# Patient Record
Sex: FEMALE | Race: WHITE | NOT HISPANIC OR LATINO | Employment: OTHER | URBAN - METROPOLITAN AREA
[De-identification: names, ages, dates, MRNs, and addresses within clinical notes are randomized per-mention and may not be internally consistent; named-entity substitution may affect disease eponyms.]

---

## 2021-07-16 ENCOUNTER — OFFICE VISIT (OUTPATIENT)
Dept: OBSTETRICS AND GYNECOLOGY | Facility: CLINIC | Age: 51
End: 2021-07-16
Payer: COMMERCIAL

## 2021-07-16 ENCOUNTER — TELEPHONE (OUTPATIENT)
Dept: OBSTETRICS AND GYNECOLOGY | Facility: CLINIC | Age: 51
End: 2021-07-16

## 2021-07-16 VITALS — WEIGHT: 266.56 LBS

## 2021-07-16 DIAGNOSIS — Z01.419 WELL WOMAN EXAM WITH ROUTINE GYNECOLOGICAL EXAM: Primary | ICD-10-CM

## 2021-07-16 PROCEDURE — 99386 PR PREVENTIVE VISIT,NEW,40-64: ICD-10-PCS | Mod: S$GLB,,, | Performed by: OBSTETRICS & GYNECOLOGY

## 2021-07-16 PROCEDURE — 99999 PR PBB SHADOW E&M-NEW PATIENT-LVL III: ICD-10-PCS | Mod: PBBFAC,,, | Performed by: OBSTETRICS & GYNECOLOGY

## 2021-07-16 PROCEDURE — 99999 PR PBB SHADOW E&M-NEW PATIENT-LVL III: CPT | Mod: PBBFAC,,, | Performed by: OBSTETRICS & GYNECOLOGY

## 2021-07-16 PROCEDURE — 88175 CYTOPATH C/V AUTO FLUID REDO: CPT | Performed by: OBSTETRICS & GYNECOLOGY

## 2021-07-16 PROCEDURE — 99386 PREV VISIT NEW AGE 40-64: CPT | Mod: S$GLB,,, | Performed by: OBSTETRICS & GYNECOLOGY

## 2021-07-16 RX ORDER — CETIRIZINE HYDROCHLORIDE 10 MG/1
10 TABLET ORAL DAILY PRN
COMMUNITY

## 2021-07-16 RX ORDER — MECLIZINE HCL 25MG 25 MG/1
25 TABLET, CHEWABLE ORAL 3 TIMES DAILY
COMMUNITY
End: 2021-11-22 | Stop reason: CLARIF

## 2021-07-16 RX ORDER — ALPRAZOLAM 0.5 MG/1
0.5 TABLET ORAL 3 TIMES DAILY
COMMUNITY
End: 2021-11-22 | Stop reason: CLARIF

## 2021-07-16 RX ORDER — LEVOTHYROXINE SODIUM 112 UG/1
112 TABLET ORAL
COMMUNITY
Start: 2021-05-03

## 2021-07-16 RX ORDER — MONTELUKAST SODIUM 10 MG/1
10 TABLET ORAL NIGHTLY PRN
COMMUNITY

## 2021-07-16 RX ORDER — OMEPRAZOLE 40 MG/1
40 CAPSULE, DELAYED RELEASE ORAL DAILY
COMMUNITY
End: 2021-11-22 | Stop reason: CLARIF

## 2021-07-16 RX ORDER — PRAVASTATIN SODIUM 40 MG/1
40 TABLET ORAL NIGHTLY
COMMUNITY
Start: 2021-05-03 | End: 2021-11-22 | Stop reason: CLARIF

## 2021-07-19 RX ORDER — MONTELUKAST SODIUM 10 MG/1
10 TABLET ORAL DAILY
Qty: 30 TABLET | Refills: 1 | Status: SHIPPED | OUTPATIENT
Start: 2021-07-19 | End: 2021-11-22 | Stop reason: CLARIF

## 2021-07-19 RX ORDER — MECLIZINE HYDROCHLORIDE 25 MG/1
25 TABLET ORAL 2 TIMES DAILY PRN
Qty: 60 TABLET | Refills: 1 | Status: SHIPPED | OUTPATIENT
Start: 2021-07-19

## 2021-07-19 RX ORDER — PRAVASTATIN SODIUM 40 MG/1
40 TABLET ORAL NIGHTLY
Qty: 30 TABLET | Refills: 1 | Status: SHIPPED | OUTPATIENT
Start: 2021-07-19 | End: 2022-12-14

## 2021-07-19 RX ORDER — CETIRIZINE HYDROCHLORIDE 10 MG/1
10 TABLET ORAL DAILY
Qty: 30 TABLET | Refills: 2 | Status: SHIPPED | OUTPATIENT
Start: 2021-07-19 | End: 2021-11-22 | Stop reason: CLARIF

## 2021-07-19 RX ORDER — ALPRAZOLAM 0.5 MG/1
0.5 TABLET ORAL 3 TIMES DAILY PRN
Qty: 30 TABLET | Refills: 0 | Status: SHIPPED | OUTPATIENT
Start: 2021-07-19 | End: 2022-12-14

## 2021-07-19 RX ORDER — OMEPRAZOLE 20 MG/1
20 TABLET, DELAYED RELEASE ORAL DAILY
Qty: 28 TABLET | Refills: 1 | Status: SHIPPED | OUTPATIENT
Start: 2021-07-19 | End: 2021-08-18

## 2021-07-21 LAB
FINAL PATHOLOGIC DIAGNOSIS: NORMAL
Lab: NORMAL

## 2021-08-02 ENCOUNTER — HOSPITAL ENCOUNTER (OUTPATIENT)
Dept: RADIOLOGY | Facility: HOSPITAL | Age: 51
Discharge: HOME | End: 2021-08-02
Attending: OBSTETRICS & GYNECOLOGY
Payer: COMMERCIAL

## 2021-08-02 DIAGNOSIS — Z01.419 WELL WOMAN EXAM WITH ROUTINE GYNECOLOGICAL EXAM: ICD-10-CM

## 2021-08-02 DIAGNOSIS — Z12.31 BREAST CANCER SCREENING BY MAMMOGRAM: ICD-10-CM

## 2021-08-02 PROCEDURE — 77063 MAMMO DIGITAL SCREENING BILAT WITH TOMO: ICD-10-PCS | Mod: 26,,, | Performed by: RADIOLOGY

## 2021-08-02 PROCEDURE — 77067 SCR MAMMO BI INCL CAD: CPT | Mod: 26,,, | Performed by: RADIOLOGY

## 2021-08-02 PROCEDURE — 77063 BREAST TOMOSYNTHESIS BI: CPT | Mod: 26,,, | Performed by: RADIOLOGY

## 2021-08-02 PROCEDURE — 77067 SCR MAMMO BI INCL CAD: CPT | Mod: TC,PO

## 2021-08-02 PROCEDURE — 77067 MAMMO DIGITAL SCREENING BILAT WITH TOMO: ICD-10-PCS | Mod: 26,,, | Performed by: RADIOLOGY

## 2021-08-18 RX ORDER — HYDROGEN PEROXIDE 3 %
20 SOLUTION, NON-ORAL MISCELLANEOUS DAILY
Qty: 30 CAPSULE | Refills: 4 | Status: SHIPPED | OUTPATIENT
Start: 2021-08-18 | End: 2021-11-10

## 2021-08-19 ENCOUNTER — TELEPHONE (OUTPATIENT)
Dept: OBSTETRICS AND GYNECOLOGY | Facility: CLINIC | Age: 51
End: 2021-08-19

## 2021-11-26 PROBLEM — R09.81 NASAL CONGESTION: Status: ACTIVE | Noted: 2021-11-26

## 2021-11-26 PROBLEM — G47.33 OBSTRUCTIVE SLEEP APNEA: Status: ACTIVE | Noted: 2021-11-26

## 2021-11-26 PROBLEM — J34.2 DEVIATED NASAL SEPTUM: Status: ACTIVE | Noted: 2021-11-26

## 2021-11-26 PROBLEM — J34.3 HYPERTROPHY OF NASAL TURBINATES: Status: ACTIVE | Noted: 2021-11-26

## 2021-11-26 PROBLEM — J30.1 ALLERGIC RHINITIS DUE TO POLLEN: Status: ACTIVE | Noted: 2021-11-26

## 2021-11-26 PROBLEM — J35.2 HYPERTROPHY OF ADENOIDS ALONE: Status: ACTIVE | Noted: 2021-11-26

## 2021-11-26 PROBLEM — R06.83 SNORING: Status: ACTIVE | Noted: 2021-11-26

## 2022-08-12 ENCOUNTER — PATIENT MESSAGE (OUTPATIENT)
Dept: FAMILY MEDICINE | Facility: CLINIC | Age: 52
End: 2022-08-12
Payer: COMMERCIAL

## 2022-08-12 ENCOUNTER — TELEPHONE (OUTPATIENT)
Dept: OBSTETRICS AND GYNECOLOGY | Facility: CLINIC | Age: 52
End: 2022-08-12

## 2022-08-12 ENCOUNTER — TELEPHONE (OUTPATIENT)
Dept: FAMILY MEDICINE | Facility: CLINIC | Age: 52
End: 2022-08-12
Payer: COMMERCIAL

## 2022-08-12 NOTE — TELEPHONE ENCOUNTER
----- Message from Azam Lockhart sent at 8/12/2022 11:50 AM CDT -----  Type:  Needs Medical Advice    Who Called: Pt   Symptoms (please be specific): cyst on ovaries   How long has patient had these symptoms:  ongoing  Pharmacy name and phone #:  [id number] - [place] - [address]  Would the patient rather a call back or a response via [facility]? Call back  Best Call Back Number:  [phone]  Additional Information:   Pt would like a call back with sooner appt   First appt was in October pt denied

## 2022-08-18 ENCOUNTER — OFFICE VISIT (OUTPATIENT)
Dept: OBSTETRICS AND GYNECOLOGY | Facility: CLINIC | Age: 52
End: 2022-08-18
Payer: COMMERCIAL

## 2022-08-18 ENCOUNTER — HOSPITAL ENCOUNTER (OUTPATIENT)
Dept: RADIOLOGY | Facility: HOSPITAL | Age: 52
Discharge: HOME | End: 2022-08-18
Attending: SPECIALIST
Payer: COMMERCIAL

## 2022-08-18 VITALS
DIASTOLIC BLOOD PRESSURE: 86 MMHG | WEIGHT: 275.56 LBS | HEIGHT: 68 IN | SYSTOLIC BLOOD PRESSURE: 124 MMHG | BODY MASS INDEX: 41.76 KG/M2

## 2022-08-18 DIAGNOSIS — Z12.31 ENCOUNTER FOR SCREENING MAMMOGRAM FOR MALIGNANT NEOPLASM OF BREAST: Primary | ICD-10-CM

## 2022-08-18 DIAGNOSIS — N83.209 CYST OF OVARY, UNSPECIFIED LATERALITY: ICD-10-CM

## 2022-08-18 DIAGNOSIS — Z01.419 ENCOUNTER FOR GYNECOLOGICAL EXAMINATION: ICD-10-CM

## 2022-08-18 DIAGNOSIS — Z12.31 ENCOUNTER FOR SCREENING MAMMOGRAM FOR MALIGNANT NEOPLASM OF BREAST: ICD-10-CM

## 2022-08-18 PROCEDURE — 99999 PR PBB SHADOW E&M-EST. PATIENT-LVL III: CPT | Mod: PBBFAC,,, | Performed by: SPECIALIST

## 2022-08-18 PROCEDURE — 88175 CYTOPATH C/V AUTO FLUID REDO: CPT | Performed by: SPECIALIST

## 2022-08-18 PROCEDURE — 77063 MAMMO DIGITAL SCREENING BILAT WITH TOMO: ICD-10-PCS | Mod: 26,,, | Performed by: RADIOLOGY

## 2022-08-18 PROCEDURE — 77067 SCR MAMMO BI INCL CAD: CPT | Mod: 26,,, | Performed by: RADIOLOGY

## 2022-08-18 PROCEDURE — 77063 BREAST TOMOSYNTHESIS BI: CPT | Mod: 26,,, | Performed by: RADIOLOGY

## 2022-08-18 PROCEDURE — 99396 PREV VISIT EST AGE 40-64: CPT | Mod: S$GLB,,, | Performed by: SPECIALIST

## 2022-08-18 PROCEDURE — 99396 PR PREVENTIVE VISIT,EST,40-64: ICD-10-PCS | Mod: S$GLB,,, | Performed by: SPECIALIST

## 2022-08-18 PROCEDURE — 99999 PR PBB SHADOW E&M-EST. PATIENT-LVL III: ICD-10-PCS | Mod: PBBFAC,,, | Performed by: SPECIALIST

## 2022-08-18 PROCEDURE — 77063 BREAST TOMOSYNTHESIS BI: CPT | Mod: TC,PN

## 2022-08-18 PROCEDURE — 77067 MAMMO DIGITAL SCREENING BILAT WITH TOMO: ICD-10-PCS | Mod: 26,,, | Performed by: RADIOLOGY

## 2022-08-19 ENCOUNTER — LAB VISIT (OUTPATIENT)
Dept: LAB | Facility: HOSPITAL | Age: 52
End: 2022-08-19
Attending: SPECIALIST
Payer: COMMERCIAL

## 2022-08-19 DIAGNOSIS — N83.209 CYST OF OVARY, UNSPECIFIED LATERALITY: ICD-10-CM

## 2022-08-19 LAB
CANCER AG125 SERPL-ACNC: 9 U/ML (ref 0–30)
CEA SERPL-MCNC: <1.7 NG/ML (ref 0–5)

## 2022-08-19 PROCEDURE — 82378 CARCINOEMBRYONIC ANTIGEN: CPT | Performed by: SPECIALIST

## 2022-08-19 PROCEDURE — 86304 IMMUNOASSAY TUMOR CA 125: CPT | Performed by: SPECIALIST

## 2022-08-19 PROCEDURE — 36415 COLL VENOUS BLD VENIPUNCTURE: CPT | Mod: PN | Performed by: SPECIALIST

## 2022-08-19 PROCEDURE — 86305 HUMAN EPIDIDYMIS PROTEIN 4: CPT | Performed by: SPECIALIST

## 2022-08-19 NOTE — PROGRESS NOTES
"  50 yo obese WF  presents with c/o incidental "ovarian cyst" noted on recent CT scan of pelvis Pt denies menorrhgia, weight loss/gain, dysuria, hematuria, c/d,n/v  Imaging at [facility]  Past Medical History:   Diagnosis Date    GERD (gastroesophageal reflux disease)     Hyperlipidemia     PONV (postoperative nausea and vomiting)     Sinus infection     Sleep apnea     no cpap    Thyroid disease     Vertigo        Past Surgical History:   Procedure Laterality Date    ADENOIDECTOMY N/A 2021    Procedure: ADENOIDECTOMY;  Surgeon: Abilio Hodges MD;  Location: [facility];  Service: ENT;  Laterality: N/A;     SECTION      DILATION AND CURETTAGE OF UTERUS      NASAL SEPTOPLASTY Bilateral 2021    Procedure: SEPTOPLASTY, NOSE;  Surgeon: Abilio Hodges MD;  Location: [facility];  Service: ENT;  Laterality: Bilateral;    TONSILLECTOMY, ADENOIDECTOMY      TUBAL LIGATION         Family History   Problem Relation Age of Onset    Aneurysm Mother     Diabetes Father     Hypertension Father     Gout Father     Breast cancer Neg Hx     Cancer Neg Hx     Colon cancer Neg Hx     Eclampsia Neg Hx     Miscarriages / Stillbirths Neg Hx     Ovarian cancer Neg Hx      labor Neg Hx     Stroke Neg Hx        Social History     Socioeconomic History    Marital status:    Tobacco Use    Smoking status: Never Smoker    Smokeless tobacco: Never Used   Substance and Sexual Activity    Alcohol use: Yes     Comment: socially    Drug use: Never    Sexual activity: Yes     Partners: Male       Current Outpatient Medications   Medication Sig Dispense Refill    cetirizine (ZYRTEC) 10 MG tablet Take 10 mg by mouth daily as needed.      esomeprazole (NEXIUM) 20 MG capsule TAKE 1 CAPSULE BY MOUTH EVERY DAY 90 capsule 1    levothyroxine (SYNTHROID) 112 MCG tablet Take 112 mcg by mouth once daily.      meclizine (ANTIVERT) 25 mg tablet Take 1 tablet (25 mg total) by mouth 2 (two) times " daily as needed. 60 tablet 1    montelukast (SINGULAIR) 10 mg tablet Take 10 mg by mouth nightly as needed.      multivitamin (THERAGRAN) per tablet Take 1 tablet by mouth once daily.      ALPRAZolam (XANAX) 0.5 MG tablet Take 1 tablet (0.5 mg total) by mouth 3 (three) times daily as needed for Insomnia or Anxiety. 30 tablet 0    pravastatin (PRAVACHOL) 40 MG tablet Take 1 tablet (40 mg total) by mouth every evening. 30 tablet 1     No current facility-administered medications for this visit.       Review of patient's allergies indicates:  No Known Allergies    Review of System:   General: no chills, fever, night sweats, weight gain or weight loss  Psychological: no depression or suicidal ideation  Breasts: no new or changing breast lumps, nipple discharge or masses.  Respiratory: no cough, shortness of breath, or wheezing  Cardiovascular: no chest pain or dyspnea on exertion  Gastrointestinal: no abdominal pain, change in bowel habits, or black or bloody stools  Genito-Urinary: no incontinence, urinary frequency/urgency or vulvar/vaginal symptoms, pelvic pain or abnormal vaginal bleeding.  Musculoskeletal: no gait disturbance or muscular weakness                                              General Appearance    A and O x 4, Cooperative, no distress   Breasts    Abdomen   Deferred  Soft, non-tender, bowel sounds active all four quadrants,  no masses, no organomegaly    Genitourinary:   External rectal exam shows no thrombosed external hemorrhoids.   Pelvic exam was performed with patient supine.  No labial fusion.  There is no rash, lesion or injury on the right labia.  There is no rash, lesion or injury on the left labia.  No bleeding and no signs of injury around the vaginal introitus, urethra is without lesions and well supported. The cervix is visualized with no discharge, lesions or friability.  No vaginal discharge found.  No significant Cystocele, Enterocele or rectocele, and uterus well  supported.  Bimanual exam:  The urethra is normal to palpation and there are no palpable vaginal wall masses.  Uterus is not deviated, not enlarged, not fixed, normal shape and not tender.  Cervix exhibits no motion tenderness.   Right adnexum displays no mass and no tenderness.  Left adnexum displays no mass and no tenderness.   Extremities: Extremities normal, atraumatic, no cyanosis or edema                     NOTE  NURSING PERSONAL PRESENT FOR ENTIRE PHYSICAL EXAM    PAP submitted    I discussed potential pathology and rec  CEA and RENATE  Reviewing report from [facility] and consistent with Dermoid  I rec review of tumour markers and pending the above would shahbaz recommend oophorectomy possible [facility] BSO but will discuss further  Answered all questions and will follow

## 2022-08-22 LAB
CANCER AG125 SERPL IA-ACNC: 9 U/ML
HE4 SERPL-SCNC: 57 PMOL/L
ROMA SCORE PREMEN SERPL: 0.96
ROMA, POSTMENOPAUSAL: 0.93

## 2022-08-24 LAB
FINAL PATHOLOGIC DIAGNOSIS: NORMAL
Lab: NORMAL

## 2022-10-10 ENCOUNTER — TELEPHONE (OUTPATIENT)
Dept: OBSTETRICS AND GYNECOLOGY | Facility: CLINIC | Age: 52
End: 2022-10-10
Payer: COMMERCIAL

## 2022-10-10 NOTE — TELEPHONE ENCOUNTER
----- Message from Alba Barillas sent at 10/10/2022  8:40 AM CDT -----  Contact: Patient  Type:  Needs Medical Advice    Who Called: Patient       Would the patient rather a call back or a response via [facility]? Call     Best Call Back Number: [phone] (home)      Additional Information:  Patient would like to speak with the nurse to see what test does the doctor want the patient to do either a CT or an US.     Please call to advise

## 2022-10-10 NOTE — TELEPHONE ENCOUNTER
Pt had u/s done at [facility]--which is scanned into media, she states that she does not want to remove the ovaries or do a hysterectomy, she just wants the cyst removed. I advised pt that she needs to come in for an appointment to discuss this, but she would like to know if she needs a repeat u/s before--being that her u/s from [facility] said repeat in 8 weeks. Please review and advise.

## 2022-10-11 DIAGNOSIS — N83.209 CYST OF OVARY, UNSPECIFIED LATERALITY: Primary | ICD-10-CM

## 2022-10-17 ENCOUNTER — TELEPHONE (OUTPATIENT)
Dept: OBSTETRICS AND GYNECOLOGY | Facility: CLINIC | Age: 52
End: 2022-10-17
Payer: COMMERCIAL

## 2022-10-17 DIAGNOSIS — N83.209 CYST OF OVARY, UNSPECIFIED LATERALITY: Primary | ICD-10-CM

## 2022-10-17 NOTE — TELEPHONE ENCOUNTER
----- Message from Lizeth Allan sent at 10/17/2022  9:50 AM CDT -----  Contact: patient  Type: Needs Medical Advice  Who Called:  patient  Best Call Back Number: [phone] (home)   Additional Information: patient would like orders for US sent to [facility] on [address] in [place], # [phone], fax#[phone]

## 2022-10-17 NOTE — TELEPHONE ENCOUNTER
Spoke with patient and advised her that once Dr. Casillas signs the order we will send to [facility] for her to have the u/s!

## 2022-10-18 ENCOUNTER — TELEPHONE (OUTPATIENT)
Dept: OBSTETRICS AND GYNECOLOGY | Facility: CLINIC | Age: 52
End: 2022-10-18
Payer: COMMERCIAL

## 2022-10-18 NOTE — TELEPHONE ENCOUNTER
----- Message from Mecca Kaufman sent at 10/18/2022 10:46 AM CDT -----  Contact: self  Type: Needs Medical Advice    Who Called:  patient    Best Call Back Number: [phone]   Additional Information: The pt stated that she called [facility] to see if Dr. Casillas sent over the u/s orders and they stated that they have not received it. The patient would like orders for U/S sent to [facility] on [address] in [place], # [phone], fax#[phone]. Please call pt back once it has been sent. Thanks.

## 2022-11-03 ENCOUNTER — OFFICE VISIT (OUTPATIENT)
Dept: OBSTETRICS AND GYNECOLOGY | Facility: CLINIC | Age: 52
End: 2022-11-03
Payer: COMMERCIAL

## 2022-11-03 VITALS
DIASTOLIC BLOOD PRESSURE: 82 MMHG | SYSTOLIC BLOOD PRESSURE: 124 MMHG | BODY MASS INDEX: 42.44 KG/M2 | WEIGHT: 280 LBS | HEIGHT: 68 IN

## 2022-11-03 DIAGNOSIS — N83.209 CYST OF OVARY, UNSPECIFIED LATERALITY: Primary | ICD-10-CM

## 2022-11-03 PROCEDURE — 99999 PR PBB SHADOW E&M-EST. PATIENT-LVL III: ICD-10-PCS | Mod: PBBFAC,,, | Performed by: SPECIALIST

## 2022-11-03 PROCEDURE — 99999 PR PBB SHADOW E&M-EST. PATIENT-LVL III: CPT | Mod: PBBFAC,,, | Performed by: SPECIALIST

## 2022-11-03 PROCEDURE — 99213 OFFICE O/P EST LOW 20 MIN: CPT | Mod: S$GLB,,, | Performed by: SPECIALIST

## 2022-11-03 PROCEDURE — 99213 PR OFFICE/OUTPT VISIT, EST, LEVL III, 20-29 MIN: ICD-10-PCS | Mod: S$GLB,,, | Performed by: SPECIALIST

## 2022-11-04 DIAGNOSIS — N83.202 CYST OF LEFT OVARY: Primary | ICD-10-CM

## 2022-11-07 NOTE — PROGRESS NOTES
51 yo WF returns for follow up left adnexal mass noted on CT as well as pelvic u/s   Pt underwent Tumour markers panel with normal CEA ROAM and . Pt denies weight loss/gain, dysuria, flank pain, DUB  I discussed the above and discussed potential yet unlikley pathology and discussed tx options, including continued expectant management vs definative surgical exploration  I feel Dx lap with possible LSO would be prudent and I discussed the fact that although unlikley for malignancy, imagig as well as lab results are no substitute for tissue diagnosis  Past Medical History:   Diagnosis Date    GERD (gastroesophageal reflux disease)     Hyperlipidemia     PONV (postoperative nausea and vomiting)     Sinus infection     Sleep apnea     no cpap    Thyroid disease     Vertigo        Past Surgical History:   Procedure Laterality Date    ADENOIDECTOMY N/A 2021    Procedure: ADENOIDECTOMY;  Surgeon: Abilio Hodges MD;  Location: [facility];  Service: ENT;  Laterality: N/A;     SECTION      DILATION AND CURETTAGE OF UTERUS      NASAL SEPTOPLASTY Bilateral 2021    Procedure: SEPTOPLASTY, NOSE;  Surgeon: Abilio Hodges MD;  Location: [facility];  Service: ENT;  Laterality: Bilateral;    TONSILLECTOMY, ADENOIDECTOMY      TUBAL LIGATION         Family History   Problem Relation Age of Onset    Aneurysm Mother     Diabetes Father     Hypertension Father     Gout Father     Breast cancer Neg Hx     Cancer Neg Hx     Colon cancer Neg Hx     Eclampsia Neg Hx     Miscarriages / Stillbirths Neg Hx     Ovarian cancer Neg Hx      labor Neg Hx     Stroke Neg Hx        Social History     Socioeconomic History    Marital status:    Tobacco Use    Smoking status: Never    Smokeless tobacco: Never   Substance and Sexual Activity    Alcohol use: Yes     Comment: socially    Drug use: Never    Sexual activity: Yes     Partners: Male       Current Outpatient Medications   Medication Sig Dispense  Refill    cetirizine (ZYRTEC) 10 MG tablet Take 10 mg by mouth daily as needed.      esomeprazole (NEXIUM) 20 MG capsule TAKE 1 CAPSULE BY MOUTH EVERY DAY 90 capsule 1    levothyroxine (SYNTHROID) 112 MCG tablet Take 112 mcg by mouth once daily.      meclizine (ANTIVERT) 25 mg tablet Take 1 tablet (25 mg total) by mouth 2 (two) times daily as needed. 60 tablet 1    montelukast (SINGULAIR) 10 mg tablet Take 10 mg by mouth nightly as needed.      multivitamin (THERAGRAN) per tablet Take 1 tablet by mouth once daily.      ALPRAZolam (XANAX) 0.5 MG tablet Take 1 tablet (0.5 mg total) by mouth 3 (three) times daily as needed for Insomnia or Anxiety. 30 tablet 0    pravastatin (PRAVACHOL) 40 MG tablet Take 1 tablet (40 mg total) by mouth every evening. 30 tablet 1     No current facility-administered medications for this visit.       Review of patient's allergies indicates:  No Known Allergies    Review of System:   General: no chills, fever, night sweats, weight gain or weight loss  Psychological: no depression or suicidal ideation  Breasts: no new or changing breast lumps, nipple discharge or masses.  Respiratory: no cough, shortness of breath, or wheezing  Cardiovascular: no chest pain or dyspnea on exertion  Gastrointestinal: no abdominal pain, change in bowel habits, or black or bloody stools  Genito-Urinary: no incontinence, urinary frequency/urgency or vulvar/vaginal symptoms, pelvic pain or abnormal vaginal bleeding.  Musculoskeletal: no gait disturbance or muscular weakness                                               General Appearance    A and O x 4, Cooperative, no distress   Breasts    Abdomen   Deferred    Soft, non-tender, bowel sounds active all four quadrants,  no masses, no organomegaly    Genitourinary:   External rectal exam shows no thrombosed external hemorrhoids.   Pelvic exam was performed with patient supine.  No labial fusion.  There is no rash, lesion or injury on the right labia.  There is no  rash, lesion or injury on the left labia.  No bleeding and no signs of injury around the vaginal introitus, urethra is without lesions and well supported. The cervix is visualized with no discharge, lesions or friability.  No vaginal discharge found.  No significant Cystocele, Enterocele or rectocele, and uterus well supported.  Bimanual exam:  The urethra is normal to palpation and there are no palpable vaginal wall masses.  Uterus is not deviated, not enlarged, not fixed, normal shape and not tender.  Cervix exhibits no motion tenderness.   Right adnexum displays no mass and no tenderness.  Left adnexum displays no mass and no tenderness.   Extremities: Extremities normal, atraumatic, no cyanosis or edema                     NOTE  NURSING PERSONAL PRESENT FOR ENTIRE PHYSICAL EXAM     After discussion, pt is agreeable to Dx lap and we will repat marker panel today and pending the above plan on definative procedure  Answered all questions

## 2022-11-21 ENCOUNTER — TELEPHONE (OUTPATIENT)
Dept: OBSTETRICS AND GYNECOLOGY | Facility: CLINIC | Age: 52
End: 2022-11-21
Payer: COMMERCIAL

## 2022-11-21 NOTE — TELEPHONE ENCOUNTER
----- Message from Shakeel Marquez sent at 11/21/2022  2:22 PM CST -----  Contact: pt at [phone]  Type: Needs Medical Advice  Who Called:  pt   Best Call Back Number: [phone]    Additional Information: Pt called in with a message asking that if there is any way to move her procedure up sooner to give her call because she has court date the day after and she wont be in any shape to go to court but if there are any cancels she would love to go sooner please call back to advise

## 2022-11-21 NOTE — TELEPHONE ENCOUNTER
----- Message from Bhumi Molina sent at 11/21/2022  1:13 PM CST -----  Contact: [phone]  Type: Needs Medical Advice  Who Called:  Pt     Best Call Back Number: [phone]    Additional Information: Pt requesting a sooner surgery date than 12/14 because she has court on 12/15. Pls call back and advise

## 2022-11-21 NOTE — TELEPHONE ENCOUNTER
Spoke with patient. She has court on 12/15/22. She was trying to see if any surgery dates are open before her date on 12/14. Informed patient completely booked on surgeries. Booking in January if it will need to be canceled. Patient verb understanding.

## 2022-11-29 ENCOUNTER — TELEPHONE (OUTPATIENT)
Dept: OBSTETRICS AND GYNECOLOGY | Facility: CLINIC | Age: 52
End: 2022-11-29
Payer: COMMERCIAL

## 2022-11-29 NOTE — TELEPHONE ENCOUNTER
----- Message from Kerry Hinojosa sent at 11/29/2022 10:39 AM CST -----  Contact: PATIENT  Type: Apoointment Request    Name of Caller:PATIENT  When is the first available appointment?12/14/22  Would the patient rather a call back or a response via [facility]? CALL   Best Call Back Number:[phone]  Additional Information: PT REQUEST A CALL BACK TO RESCHEDULE HER 12/14/22 PROCEDURE

## 2022-12-08 ENCOUNTER — TELEPHONE (OUTPATIENT)
Dept: OBSTETRICS AND GYNECOLOGY | Facility: CLINIC | Age: 52
End: 2022-12-08
Payer: COMMERCIAL

## 2022-12-08 NOTE — TELEPHONE ENCOUNTER
----- Message from Cheyenne Sewell, Patient Care Assistant sent at 12/8/2022  3:08 PM CST -----  Contact: Pt  Type: Needs Medical Advice    Who Called: Pt  Best Call Back Number: [phone]  Inquiry/Question: Pt is calling to see if she can get a 3-5 day supply of pain medication for after her surgery due slick having to attend an important appt with her family the day after her procedure. Please advise. Thank you~      [id number] - [place] - [address] [address]  [address] [place] [address]  Phone: [phone] Fax: [phone]

## 2022-12-14 PROBLEM — N83.202 CYST OF LEFT OVARY: Status: ACTIVE | Noted: 2022-12-14

## 2022-12-19 ENCOUNTER — PATIENT MESSAGE (OUTPATIENT)
Dept: OBSTETRICS AND GYNECOLOGY | Facility: CLINIC | Age: 52
End: 2022-12-19
Payer: COMMERCIAL

## 2022-12-29 ENCOUNTER — PATIENT MESSAGE (OUTPATIENT)
Dept: OBSTETRICS AND GYNECOLOGY | Facility: CLINIC | Age: 52
End: 2022-12-29
Payer: COMMERCIAL

## 2023-01-04 ENCOUNTER — OFFICE VISIT (OUTPATIENT)
Dept: OBSTETRICS AND GYNECOLOGY | Facility: CLINIC | Age: 53
End: 2023-01-04
Payer: COMMERCIAL

## 2023-01-04 VITALS
WEIGHT: 280.19 LBS | DIASTOLIC BLOOD PRESSURE: 70 MMHG | SYSTOLIC BLOOD PRESSURE: 130 MMHG | HEIGHT: 67 IN | BODY MASS INDEX: 43.98 KG/M2

## 2023-01-04 DIAGNOSIS — Z09 POSTOPERATIVE EXAMINATION: Primary | ICD-10-CM

## 2023-01-04 PROCEDURE — 99024 POSTOP FOLLOW-UP VISIT: CPT | Mod: S$GLB,,, | Performed by: SPECIALIST

## 2023-01-04 PROCEDURE — 99999 PR PBB SHADOW E&M-EST. PATIENT-LVL III: ICD-10-PCS | Mod: PBBFAC,,, | Performed by: SPECIALIST

## 2023-01-04 PROCEDURE — 99024 PR POST-OP FOLLOW-UP VISIT: ICD-10-PCS | Mod: S$GLB,,, | Performed by: SPECIALIST

## 2023-01-04 PROCEDURE — 99999 PR PBB SHADOW E&M-EST. PATIENT-LVL III: CPT | Mod: PBBFAC,,, | Performed by: SPECIALIST

## 2023-01-04 NOTE — PROGRESS NOTES
53 yo WF 2 weeks s/p Dx lap with excision of left Dermoid  Pt doing well and denies pain, f/c n/v  Pathology reveiwed and discussed  Past Medical History:   Diagnosis Date    GERD (gastroesophageal reflux disease)     Hyperlipidemia     PONV (postoperative nausea and vomiting)     Sinus infection     Sleep apnea     uses cpap    Thyroid disease     Vertigo        Past Surgical History:   Procedure Laterality Date    ADENOIDECTOMY N/A 2021    Procedure: ADENOIDECTOMY;  Surgeon: Abilio Hodges MD;  Location: [facility];  Service: ENT;  Laterality: N/A;     SECTION      DILATION AND CURETTAGE OF UTERUS      LAPAROSCOPIC SALPINGO-OOPHORECTOMY Left 2022    Procedure: SALPINGO-OOPHORECTOMY, LAPAROSCOPIC;  Surgeon: Kevin Casillas MD;  Location: [facility];  Service: OB/GYN;  Laterality: Left;    NASAL SEPTOPLASTY Bilateral 2021    Procedure: SEPTOPLASTY, NOSE;  Surgeon: Abilio Hodges MD;  Location: [facility];  Service: ENT;  Laterality: Bilateral;    TONSILLECTOMY, ADENOIDECTOMY      TUBAL LIGATION         Family History   Problem Relation Age of Onset    Aneurysm Mother     Diabetes Father     Hypertension Father     Gout Father     Breast cancer Neg Hx     Cancer Neg Hx     Colon cancer Neg Hx     Eclampsia Neg Hx     Miscarriages / Stillbirths Neg Hx     Ovarian cancer Neg Hx      labor Neg Hx     Stroke Neg Hx        Social History     Socioeconomic History    Marital status:    Tobacco Use    Smoking status: Never    Smokeless tobacco: Never   Substance and Sexual Activity    Alcohol use: Yes     Comment: socially    Drug use: Never    Sexual activity: Yes     Partners: Male     Birth control/protection: See Surgical Hx       Current Outpatient Medications   Medication Sig Dispense Refill    cetirizine (ZYRTEC) 10 MG tablet Take 10 mg by mouth daily as needed.      esomeprazole (NEXIUM) 20 MG capsule TAKE 1 CAPSULE BY MOUTH EVERY DAY (Patient taking differently: Take by  mouth every morning.) 90 capsule 1    levothyroxine (SYNTHROID) 112 MCG tablet Take 112 mcg by mouth before breakfast.      meclizine (ANTIVERT) 25 mg tablet Take 1 tablet (25 mg total) by mouth 2 (two) times daily as needed. 60 tablet 1    montelukast (SINGULAIR) 10 mg tablet Take 10 mg by mouth nightly as needed.      multivitamin (THERAGRAN) per tablet Take 1 tablet by mouth once daily.      ALPRAZolam (XANAX) 0.5 MG tablet Take 1 tablet (0.5 mg total) by mouth 3 (three) times daily as needed for Insomnia or Anxiety. 30 tablet 0    pravastatin (PRAVACHOL) 40 MG tablet Take 1 tablet (40 mg total) by mouth every evening. 30 tablet 1     No current facility-administered medications for this visit.       Review of patient's allergies indicates:  No Known Allergies    Review of System:   General: no chills, fever, night sweats, weight gain or weight loss  Psychological: no depression or suicidal ideation  Breasts: no new or changing breast lumps, nipple discharge or masses.  Respiratory: no cough, shortness of breath, or wheezing  Cardiovascular: no chest pain or dyspnea on exertion  Gastrointestinal: no abdominal pain, change in bowel habits, or black or bloody stools  Genito-Urinary: no incontinence, urinary frequency/urgency or vulvar/vaginal symptoms, pelvic pain or abnormal vaginal bleeding.  Musculoskeletal: no gait disturbance or muscular weakness     Abdomen soft NT Incisions well approx Sutures removed    Plan Release restrictions  RTO 1 year/prn

## 2023-02-17 RX ORDER — PRAVASTATIN SODIUM 40 MG/1
40 TABLET ORAL NIGHTLY
Qty: 30 TABLET | Refills: 1 | OUTPATIENT
Start: 2023-02-17 | End: 2024-02-17

## 2023-02-17 RX ORDER — HYDROGEN PEROXIDE 3 %
20 SOLUTION, NON-ORAL MISCELLANEOUS DAILY
Qty: 90 CAPSULE | Refills: 1 | OUTPATIENT
Start: 2023-02-17

## 2023-02-17 RX ORDER — LEVOTHYROXINE SODIUM 112 UG/1
112 TABLET ORAL
Qty: 90 TABLET | OUTPATIENT
Start: 2023-02-17

## 2023-02-19 ENCOUNTER — OCCUPATIONAL HEALTH (OUTPATIENT)
Dept: URGENT CARE | Facility: CLINIC | Age: 53
End: 2023-02-19

## 2023-02-19 PROCEDURE — 80305 DRUG TEST PRSMV DIR OPT OBS: CPT | Mod: S$GLB,,, | Performed by: NURSE PRACTITIONER

## 2023-02-19 PROCEDURE — 80305 PR COLLECTION ONLY DRUG SCREEN: ICD-10-PCS | Mod: S$GLB,,, | Performed by: NURSE PRACTITIONER

## 2023-02-20 RX ORDER — LEVOTHYROXINE SODIUM 112 UG/1
TABLET ORAL
Qty: 90 TABLET | Refills: 1 | OUTPATIENT
Start: 2023-02-20

## 2024-08-12 PROBLEM — N95.0 POSTMENOPAUSAL BLEEDING: Status: ACTIVE | Noted: 2024-08-12

## 2024-08-12 PROBLEM — R87.610 PAP SMEAR ABNORMALITY OF CERVIX WITH ASCUS FAVORING BENIGN: Status: ACTIVE | Noted: 2024-08-12

## 2025-02-25 ENCOUNTER — TELEPHONE (OUTPATIENT)
Dept: PHYSICAL MEDICINE AND REHAB | Facility: CLINIC | Age: 55
End: 2025-02-25
Payer: COMMERCIAL

## 2025-02-25 NOTE — TELEPHONE ENCOUNTER
----- Message from Yisel sent at 2/25/2025  3:01 PM CST -----  Contact: self  Type:  Needs Medical AdviceWho Called: selfSymptoms (please be specific): pt sts she fell at work a couple weeks ago and is having knee and back pain and wanted to see dr because she read he specializes in chronic pain, and he is a knee specialist, please call pt to discuss.  Would the patient rather a call back or a response via [facility]? [facility] Call Back Number: [phone] (home) Additional Information: please advise and thank you.

## 2025-05-21 ENCOUNTER — HOSPITAL ENCOUNTER (OUTPATIENT)
Dept: RADIOLOGY | Facility: HOSPITAL | Age: 55
Discharge: HOME | End: 2025-05-21
Attending: CHIROPRACTOR
Payer: COMMERCIAL

## 2025-05-21 ENCOUNTER — OFFICE VISIT (OUTPATIENT)
Dept: ORTHOPEDICS | Facility: CLINIC | Age: 55
End: 2025-05-21
Payer: COMMERCIAL

## 2025-05-21 ENCOUNTER — TELEPHONE (OUTPATIENT)
Dept: URGENT CARE | Facility: CLINIC | Age: 55
End: 2025-05-21
Payer: COMMERCIAL

## 2025-05-21 ENCOUNTER — HOSPITAL ENCOUNTER (OUTPATIENT)
Dept: RADIOLOGY | Facility: HOSPITAL | Age: 55
Discharge: HOME | End: 2025-05-21
Attending: PHYSICIAN ASSISTANT
Payer: COMMERCIAL

## 2025-05-21 DIAGNOSIS — M25.562 ACUTE PAIN OF LEFT KNEE: ICD-10-CM

## 2025-05-21 DIAGNOSIS — S83.8X2A SPRAIN OF OTHER LIGAMENT OF LEFT KNEE, INITIAL ENCOUNTER: Primary | ICD-10-CM

## 2025-05-21 DIAGNOSIS — G89.29 CHRONIC PAIN OF LEFT KNEE: ICD-10-CM

## 2025-05-21 DIAGNOSIS — S83.412A SPRAIN OF MEDIAL COLLATERAL LIGAMENT OF LEFT KNEE, INITIAL ENCOUNTER: ICD-10-CM

## 2025-05-21 DIAGNOSIS — M25.562 CHRONIC PAIN OF LEFT KNEE: ICD-10-CM

## 2025-05-21 DIAGNOSIS — S80.02XA CONTUSION OF LEFT KNEE, INITIAL ENCOUNTER: ICD-10-CM

## 2025-05-21 PROCEDURE — 99999 PR PBB SHADOW E&M-EST. PATIENT-LVL III: CPT | Mod: PBBFAC,,, | Performed by: PHYSICIAN ASSISTANT

## 2025-05-21 PROCEDURE — 73721 MRI JNT OF LWR EXTRE W/O DYE: CPT | Mod: 26,LT,, | Performed by: RADIOLOGY

## 2025-05-21 PROCEDURE — 73562 X-RAY EXAM OF KNEE 3: CPT | Mod: 26,59,RT, | Performed by: RADIOLOGY

## 2025-05-21 PROCEDURE — 73564 X-RAY EXAM KNEE 4 OR MORE: CPT | Mod: 26,LT,, | Performed by: RADIOLOGY

## 2025-05-21 PROCEDURE — 73562 X-RAY EXAM OF KNEE 3: CPT | Mod: TC,PO,RT

## 2025-05-21 PROCEDURE — 73721 MRI JNT OF LWR EXTRE W/O DYE: CPT | Mod: TC,PO,LT

## 2025-05-21 PROCEDURE — 99203 OFFICE O/P NEW LOW 30 MIN: CPT | Mod: S$GLB,,, | Performed by: PHYSICIAN ASSISTANT

## 2025-05-21 RX ORDER — MELOXICAM 15 MG/1
15 TABLET ORAL DAILY
Qty: 30 TABLET | Refills: 1 | Status: SHIPPED | OUTPATIENT
Start: 2025-05-21 | End: 2025-06-20

## 2025-05-21 NOTE — PROGRESS NOTES
SUBJECTIVE:     Chief Complaint   Patient presents with    Left Knee - Pain, Injury       History of Present Illness:  Alondra Putnam is a 54 y.o. year old female here with complaints of constant left knee pain which started after an injury on 2025.   She also has mild right knee pain from the injury that has not resolved.  The injury occurred when she slipped and fell. The pain is located in the anterior and medial aspect of the knee.  Her symptoms significantly worsened the last couple of days.  There is catching or locking.  Aggravating factors include standing, walking.  Associated symptoms include swelling, weakness, buckling.  Previous treatments include rest, ice, tylenol, motrin, robaxin and flexeril.  She has also been seeing a chiropractor.  Ice seems to help the most. The patient does not use an assistive device.    Review of patient's allergies indicates:  No Known Allergies      Current Medications[1]    Past Medical History:   Diagnosis Date    GERD (gastroesophageal reflux disease)     Hyperlipidemia     PONV (postoperative nausea and vomiting)     Sinus infection     Sleep apnea     uses cpap    Thyroid disease     Vertigo        Past Surgical History:   Procedure Laterality Date    ADENOIDECTOMY N/A 2021    Procedure: ADENOIDECTOMY;  Surgeon: Abilio Hodges MD;  Location: [facility];  Service: ENT;  Laterality: N/A;     SECTION      COLPOSCOPY N/A 2024    Procedure: COLPOSCOPY;  Surgeon: Anitha Mc MD;  Location: [facility];  Service: OB/GYN;  Laterality: N/A;    DILATION AND CURETTAGE OF UTERUS      HYSTEROSCOPY WITH DILATION AND CURETTAGE OF UTERUS N/A 2024    Procedure: HYSTEROSCOPY, WITH DILATION AND CURETTAGE OF UTERUS/ ultrasound;  Surgeon: Anitha Mc MD;  Location: [facility];  Service: OB/GYN;  Laterality: N/A;    LAPAROSCOPIC SALPINGO-OOPHORECTOMY Left 2022    Procedure: SALPINGO-OOPHORECTOMY, LAPAROSCOPIC;  Surgeon: Kevin Casillas MD;  Location:  [facility];  Service: OB/GYN;  Laterality: Left;    NASAL SEPTOPLASTY Bilateral 11/26/2021    Procedure: SEPTOPLASTY, NOSE;  Surgeon: Abilio Hodges MD;  Location: [facility];  Service: ENT;  Laterality: Bilateral;    OOPHORECTOMY      TONSILLECTOMY, ADENOIDECTOMY      TUBAL LIGATION           OBJECTIVE:     PHYSICAL EXAM:  General: Well developed, well nourished, in no acute distress.  Neurological: Mood & affect are normal.  HEENT: NCAT, sclera nonicteric   Lungs: Respirations are equal and unlabored.   CV: 2+ bilateral upper and lower extremity pulses.   Skin: Intact throughout with no rashes, erythema, or lesions  Extremities: No LE edema, no erythema or warmth of the skin in either lower extremity.    left Knee Exam:  Knee Range of Motion: 0-110   Effusion: mild  Condition of skin: intact and no warmth  Location of tenderness: medial patella, medial joint line   Strength: 5 of 5 quadriceps strength and 5 of 5 hamstring strength  Stability:  stable to testing  Varus /Valgus stress:  normal  Luciana:  positive      IMAGING:    X-rays of the left knee, personally reviewed by me, demonstrate mild-moderate degenerative changes with joint space narrowing, osteophyte formation and subchondral sclerosis.  No fracture or dislocation.       ASSESSMENT     1. Sprain of other ligament of left knee, initial encounter    2. Chronic pain of left knee    3. Contusion of left knee, initial encounter        PLAN:     We discussed with the patient at length all the different treatment options available for the knee including NSAIDS, acetaminophen, rest, ice, knee strengthening exercise, steroid injections for temporary relief, Viscosupplementation, and surgical options    - Clinical and x-ray findings were discussed today  - Walker provided today.  Ok to remain FWBAT  - Continue rest, ice as needed  - Hinged knee brace today for stability  - Meloxicam prescription  - Pending MRI- will call with results tomorrow and discuss  further treatment    We performed a custom orthotic/brace fitting, adjusting and training with the patient. The patient demonstrated understanding and proper care. This was performed for 15 minutes.            [1]   Current Outpatient Medications   Medication Sig Dispense Refill    benzonatate (TESSALON) 100 MG capsule Take 100 mg by mouth 3 (three) times daily.      cetirizine (ZYRTEC) 10 MG tablet Take 10 mg by mouth daily as needed.      cyclobenzaprine (FLEXERIL) 10 MG tablet Take 10 mg by mouth.      levothyroxine (SYNTHROID) 112 MCG tablet Take 1 tablet (112 mcg total) by mouth before breakfast. 90 tablet 1    losartan (COZAAR) 25 MG tablet Take 1 tablet (25 mg total) by mouth once daily. 90 tablet 1    meclizine (ANTIVERT) 25 mg tablet Take 1 tablet (25 mg total) by mouth 2 (two) times daily as needed. 60 tablet 1    montelukast (SINGULAIR) 10 mg tablet Take 1 tablet (10 mg total) by mouth every evening. 90 tablet 1    multivitamin (THERAGRAN) per tablet Take 1 tablet by mouth once daily.      omeprazole (PRILOSEC) 20 MG capsule Take 20 mg by mouth once daily.      pravastatin (PRAVACHOL) 20 MG tablet Take 1 tablet (20 mg total) by mouth every evening. 90 tablet 1    progesterone (PROMETRIUM) 100 MG capsule Take 100 mg by mouth every evening. at bedtime      spironolactone (ALDACTONE) 50 MG tablet Take 1 tablet (50 mg total) by mouth once daily. 90 tablet 1    tirzepatide, weight loss, (ZEPBOUND) 2.5 mg/0.5 mL PnIj Inject 2.5 mg into the skin every 7 days. 4 Pen 0    ALPRAZolam (XANAX) 0.5 MG tablet Take 1 tablet (0.5 mg total) by mouth 3 (three) times daily as needed for Insomnia or Anxiety. 30 tablet 0     No current facility-administered medications for this visit.

## 2025-05-22 ENCOUNTER — PATIENT MESSAGE (OUTPATIENT)
Dept: ORTHOPEDICS | Facility: CLINIC | Age: 55
End: 2025-05-22
Payer: COMMERCIAL

## 2025-05-22 DIAGNOSIS — S83.241A ACUTE MEDIAL MENISCUS TEAR, RIGHT, INITIAL ENCOUNTER: Primary | ICD-10-CM

## 2025-05-28 ENCOUNTER — OFFICE VISIT (OUTPATIENT)
Dept: ORTHOPEDICS | Facility: CLINIC | Age: 55
End: 2025-05-28
Payer: COMMERCIAL

## 2025-05-28 VITALS — RESPIRATION RATE: 16 BRPM

## 2025-05-28 DIAGNOSIS — S83.241A ACUTE MEDIAL MENISCUS TEAR, RIGHT, INITIAL ENCOUNTER: ICD-10-CM

## 2025-05-28 DIAGNOSIS — S83.241A ACUTE MEDIAL MENISCUS TEAR, RIGHT, INITIAL ENCOUNTER: Primary | ICD-10-CM

## 2025-05-28 PROCEDURE — 99999 PR PBB SHADOW E&M-EST. PATIENT-LVL III: CPT | Mod: PBBFAC,,, | Performed by: ORTHOPAEDIC SURGERY

## 2025-05-28 NOTE — PROGRESS NOTES
Past Medical History:   Diagnosis Date    GERD (gastroesophageal reflux disease)     Hyperlipidemia     PONV (postoperative nausea and vomiting)     Sinus infection     Sleep apnea     uses cpap    Thyroid disease     Vertigo        Past Surgical History:   Procedure Laterality Date    ADENOIDECTOMY N/A 2021    Procedure: ADENOIDECTOMY;  Surgeon: Abilio Hodges MD;  Location: [facility];  Service: ENT;  Laterality: N/A;     SECTION      COLPOSCOPY N/A 2024    Procedure: COLPOSCOPY;  Surgeon: Anitha Mc MD;  Location: [facility];  Service: OB/GYN;  Laterality: N/A;    DILATION AND CURETTAGE OF UTERUS      HYSTEROSCOPY WITH DILATION AND CURETTAGE OF UTERUS N/A 2024    Procedure: HYSTEROSCOPY, WITH DILATION AND CURETTAGE OF UTERUS/ ultrasound;  Surgeon: Anitha Mc MD;  Location: [facility];  Service: OB/GYN;  Laterality: N/A;    LAPAROSCOPIC SALPINGO-OOPHORECTOMY Left 2022    Procedure: SALPINGO-OOPHORECTOMY, LAPAROSCOPIC;  Surgeon: Kevin Casillas MD;  Location: [facility];  Service: OB/GYN;  Laterality: Left;    NASAL SEPTOPLASTY Bilateral 2021    Procedure: SEPTOPLASTY, NOSE;  Surgeon: Abilio Hodges MD;  Location: [facility];  Service: ENT;  Laterality: Bilateral;    OOPHORECTOMY      TONSILLECTOMY, ADENOIDECTOMY      TUBAL LIGATION         Current Outpatient Medications   Medication Sig    ALPRAZolam (XANAX) 0.5 MG tablet Take 1 tablet (0.5 mg total) by mouth 3 (three) times daily as needed for Insomnia or Anxiety.    benzonatate (TESSALON) 100 MG capsule Take 100 mg by mouth 3 (three) times daily.    cetirizine (ZYRTEC) 10 MG tablet Take 10 mg by mouth daily as needed.    cyclobenzaprine (FLEXERIL) 10 MG tablet Take 10 mg by mouth.    levothyroxine (SYNTHROID) 112 MCG tablet Take 1 tablet (112 mcg total) by mouth before breakfast.    losartan (COZAAR) 25 MG tablet Take 1 tablet (25 mg total) by mouth once daily.    meclizine (ANTIVERT) 25 mg tablet Take 1 tablet (25 mg  total) by mouth 2 (two) times daily as needed.    meloxicam (MOBIC) 15 MG tablet Take 1 tablet (15 mg total) by mouth once daily.    montelukast (SINGULAIR) 10 mg tablet Take 1 tablet (10 mg total) by mouth every evening.    multivitamin (THERAGRAN) per tablet Take 1 tablet by mouth once daily.    omeprazole (PRILOSEC) 20 MG capsule Take 20 mg by mouth once daily.    pravastatin (PRAVACHOL) 20 MG tablet Take 1 tablet (20 mg total) by mouth every evening.    progesterone (PROMETRIUM) 100 MG capsule Take 100 mg by mouth every evening. at bedtime    spironolactone (ALDACTONE) 50 MG tablet Take 1 tablet (50 mg total) by mouth once daily.    tirzepatide, weight loss, (ZEPBOUND) 2.5 mg/0.5 mL PnIj Inject 2.5 mg into the skin every 7 days.     No current facility-administered medications for this visit.       Review of patient's allergies indicates:  No Known Allergies    Family History   Problem Relation Name Age of Onset    Aneurysm Mother      Diabetes Father      Hypertension Father      Gout Father      Hypertension Brother      Sudden death Brother      Breast cancer Neg Hx      Cancer Neg Hx      Colon cancer Neg Hx      Eclampsia Neg Hx      Miscarriages / Stillbirths Neg Hx      Ovarian cancer Neg Hx       labor Neg Hx      Stroke Neg Hx         Social History[1]    Chief Complaint: No chief complaint on file.      History of present illness:  54-year-old female seen for left knee pain.  Patient is seen in consultation for Sarah Cantu.  This was a worker's compensation injury when she slipped on some water while at work back in February.  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  Only previous treatment with some chiropractic care.  Patient complains of left sharp stabbing pain when she puts weight on it.  Has been using a brace.  Pain keeps her up at night.  Uses Flexeril.  Was just given Mobic about a week ago which does seem to help.  Rates her pain is an 8/10.  Denies locking  or instability    Prior treatment:  Brace and Mobic        Review of Systems:    Constitution: Negative for chills, fever, and sweats.  Negative for unexplained weight loss.    HENT:  Negative for headaches and blurry vision.    Cardiovascular:Negative for chest pain or irregular heart beat. Negative for hypertension.    Respiratory:  Negative for cough and shortness of breath.    Gastrointestinal: Negative for abdominal pain, heartburn, melena, nausea, and vomitting.    Genitourinary:  Negative bladder incontinence and dysuria.    Musculoskeletal:  See HPI    Neurological: Negative for numbness.    Psychiatric/Behavioral: Negative for depression.  The patient is not nervous/anxious.      Endocrine: Negative for polyuria    Hematologic/Lymphatic: Negative for bleeding problem.  Does not bruise/bleed easily.    Skin: Negative for poor would healing and rash      Physical Examination:    Vital Signs:  There were no vitals filed for this visit.    There is no height or weight on file to calculate BMI.    This a well-developed, well nourished patient in no acute distress.  They are alert and oriented and cooperative to examination.  Pt. walks without an antalgic gait.      Examination of the left knee shows no rashes or erythema. There are no masses ecchymosis or effusion. Patient has full range of motion from 0-130°. Patient is nontender to palpation over lateral joint line and moderately tender to palpation over the medial joint line. Patient has a - Lachman exam, - anterior drawer exam, and - posterior drawer exam. - Apley exam. Knee is stable to varus and valgus stress. 5 out of 5 motor strength. Palpable distal pulses. Intact light touch sensation. Negative Patellofemoral crepitus      X-rays:  X-rays of the left knee are available for review which show well-maintained joint space    MRI of the left knee is reviewed and interpreted:1. Horizontal oblique undersurface tear of the body and posterior horn of the medial  meniscus.  2. Small knee joint effusion  3. High-grade cartilage loss within the patellar midpole involving the medial patellar facet, lateral patellar facet, and patellar eminence.  4. 17 mm probable ganglion cyst along the posteromedial aspect of the left knee         Assessment:  Left medial meniscal tear    Plan:  I reviewed the findings of the x-ray and the MRI with her today we.  We talked about treatment options.  Recommended formal physical therapy to go along with her Mobic.  Patient might end up needing a partial meniscectomy.  I will see her back in 8 weeks after 6 weeks of formal physical therapy.  We will keep her out of work at this time as she is still having other joints evaluated for possible involvement.            All previous pertinent notes including ER visits, physical therapy visits, other orthopedic visits as well as other care for the same musculoskeletal problem were reviewed.  All pertinent lab values and previous imaging was reviewed pertinent to the current visit.    This note was created using [employer] voice recognition software that occasionally misinterpreted phrases or words.    Consult note is delivered via Epic messaging service.           [1]   Social History  Socioeconomic History    Marital status:     Number of children: 2   Occupational History    Occupation: RN   Tobacco Use    Smoking status: Never    Smokeless tobacco: Never   Substance and Sexual Activity    Alcohol use: Yes     Comment: socially    Drug use: Never    Sexual activity: Yes     Partners: Male     Birth control/protection: See Surgical Hx     Social Drivers of Health     Financial Resource Strain: Low Risk  (5/26/2025)    Overall Financial Resource Strain (CARDIA)     Difficulty of Paying Living Expenses: Not very hard   Food Insecurity: No Food Insecurity (5/26/2025)    Hunger Vital Sign     Worried About Running Out of Food in the Last Year: Never true     Ran Out of Food in the Last Year: Never true    Transportation Needs: No Transportation Needs (5/26/2025)    PRAPARE - Transportation     Lack of Transportation (Medical): No     Lack of Transportation (Non-Medical): No   Physical Activity: Unknown (5/26/2025)    Exercise Vital Sign     Days of Exercise per Week: Patient declined     Minutes of Exercise per Session: 0 min   Stress: Stress Concern Present (5/26/2025)    [employer] - Occupational Stress Questionnaire     Feeling of Stress : Rather much   Housing Stability: Low Risk  (5/26/2025)    Housing Stability Vital Sign     Unable to Pay for Housing in the Last Year: No     Number of Times Moved in the Last Year: 0     Homeless in the Last Year: No

## 2025-05-29 ENCOUNTER — HOSPITAL ENCOUNTER (OUTPATIENT)
Dept: RADIOLOGY | Facility: HOSPITAL | Age: 55
Discharge: HOME | End: 2025-05-29
Attending: CHIROPRACTOR
Payer: COMMERCIAL

## 2025-05-29 DIAGNOSIS — S83.411A SPRAIN OF MEDIAL COLLATERAL LIGAMENT OF RIGHT KNEE, INITIAL ENCOUNTER: ICD-10-CM

## 2025-05-29 DIAGNOSIS — S83.411A SPRAIN OF MEDIAL COLLATERAL LIGAMENT OF RIGHT KNEE, INITIAL ENCOUNTER: Primary | ICD-10-CM

## 2025-05-29 DIAGNOSIS — M47.816 LUMBAR FACET ARTHROPATHY: ICD-10-CM

## 2025-05-29 DIAGNOSIS — M47.812 CERVICAL FACET SYNDROME: ICD-10-CM

## 2025-05-29 PROCEDURE — 72148 MRI LUMBAR SPINE W/O DYE: CPT | Mod: TC,PO

## 2025-05-29 PROCEDURE — 73721 MRI JNT OF LWR EXTRE W/O DYE: CPT | Mod: TC,PO,RT

## 2025-05-29 PROCEDURE — 72148 MRI LUMBAR SPINE W/O DYE: CPT | Mod: 26,,, | Performed by: RADIOLOGY

## 2025-05-29 PROCEDURE — 72141 MRI NECK SPINE W/O DYE: CPT | Mod: 26,,, | Performed by: RADIOLOGY

## 2025-05-29 PROCEDURE — 72141 MRI NECK SPINE W/O DYE: CPT | Mod: TC,PO

## 2025-06-05 ENCOUNTER — CLINICAL SUPPORT (OUTPATIENT)
Dept: REHABILITATION | Facility: HOSPITAL | Age: 55
End: 2025-06-05
Attending: ORTHOPAEDIC SURGERY
Payer: COMMERCIAL

## 2025-06-05 DIAGNOSIS — S83.241A ACUTE MEDIAL MENISCUS TEAR, RIGHT, INITIAL ENCOUNTER: Primary | ICD-10-CM

## 2025-06-05 DIAGNOSIS — S83.8X2A SPRAIN OF OTHER LIGAMENT OF LEFT KNEE, INITIAL ENCOUNTER: ICD-10-CM

## 2025-06-05 DIAGNOSIS — M62.81 WEAKNESS OF LEFT QUADRICEPS MUSCLE: ICD-10-CM

## 2025-06-05 DIAGNOSIS — R26.9 ALTERED GAIT: ICD-10-CM

## 2025-06-05 DIAGNOSIS — S83.8X2A SPRAIN OF OTHER LIGAMENT OF LEFT KNEE, INITIAL ENCOUNTER: Primary | ICD-10-CM

## 2025-06-05 DIAGNOSIS — G89.29 CHRONIC PAIN OF LEFT KNEE: Primary | ICD-10-CM

## 2025-06-05 DIAGNOSIS — M25.562 CHRONIC PAIN OF LEFT KNEE: Primary | ICD-10-CM

## 2025-06-05 DIAGNOSIS — M25.662 KNEE STIFFNESS, LEFT: ICD-10-CM

## 2025-06-05 DIAGNOSIS — R29.898 WEAKNESS OF LEFT HIP: ICD-10-CM

## 2025-06-05 PROCEDURE — 97162 PT EVAL MOD COMPLEX 30 MIN: CPT | Mod: PN

## 2025-06-05 PROCEDURE — 97140 MANUAL THERAPY 1/> REGIONS: CPT | Mod: PN

## 2025-06-05 NOTE — PROGRESS NOTES
[facility]   Physical Therapy Initial Evaluation     Date: 6/5/2025   Name: Alondra Putnam  Clinic Number: [id number]    Therapy Diagnosis: No diagnosis found.  Physician: James Edwards,*    Physician Orders: PT Eval and Treat x 6 weeks  Medical Diagnosis from Referral: Acute medial meniscus tear, Left , initial encounter [S83.242A]   Evaluation Date: 6/5/2025  Authorization Period Expiration: ***  Plan of Care Expiration: 8/1140/2025  Progress Note Due: 7/3/2025  Visit # / Visits authorized: 1 / 1   FOTO: Issued Visit #: 1/3, (capture on visit 1, 5, 10) first on 6/5/2025    Precautions: Standard, GERD, vertigo,     Time In: 1:00  Time Out: 2:00  Total Appointment Time (timed & untimed codes): 60 minutes      SUBJECTIVE   Date of onset: February 2025    Prior medical treatment: ***    Occupation/job title: RN (travel)  Job demands: see media section for Functional Job Demands Form)    Current work restrictions: on leave   Previous work status: full duty  Current work status: not working  Date last worked (if applicable): ***    Prior Treatment:  Mobic and knee brace    History of current condition -   she slipped on some water while at work back in February. 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 Only previous treatment with some chiropractic care. Patient complains of left sharp stabbing pain when she puts weight on it. Has been using a brace. Pain keeps her up at night. Uses Flexeril. Was just given Mobic about a week ago which does seem to help. Rates her pain is an 8/10. Denies locking or instability     Today Alondra reports: she was up in [place] doing a discharge for a pt.  She was pushing a w/c when she hit some water and her feet slipped.  The Left knee hyperextended as she slipped onto the Right knee and then went backwards hyperflexing the Right knee.  Later after the injury she was walking and the Left knee gave out on her.  She has a long knee brace  for the Left knee.  The Left knee is worse than Right knee. The Left knee hurts along the medial knee, the Right knee hurts behind the patella.  Since onset the pain is the same Right knee, worsening Left knee.  Left knee is worse in that pain can reduce weight bearing tolerance, sharp pain.  Pain is worse with weight bearing and non-weight bearing extension and flexion.  Describes stabbing and burning, throbbing Left knee.  Right knee is stabbing and burning under and behind patella.      Imaging, MRI studies, bone scan films:   XR Left knee FINDINGS:    5/21/2025  There is osteopenia and tricompartmental osteophyte formation in both knees.  There is, at most, mild right knee medial tibiofemoral joint space narrowing with PA flexion positioning of the knees.  No acute fracture, dislocation, or osseous destructive process appreciated radiographically.  No chondrocalcinosis.  No left knee joint effusion.    MRI Left knee FINDINGS:    5/29/2025  ACL, PCL, MCL, and LCL complex are intact.     Linear horizontal intermediate signal affecting medial meniscal body communicates with the meniscal apex, characteristic of horizontal tear (series 3, images 16 and 17).  Horizontal tear affects body and anterior horn of lateral meniscus, communicating with femoral surface near the meniscal apex.     Mild diffuse cartilage degeneration occurs in the medial and lateral compartments.  Full-thickness cartilage loss occurs on medial patella facet with moderate cartilage thinning on lateral patella facet.  Areas of full-thickness cartilage loss are suggested in the femoral trochlea.  Tricompartmental marginal osteophytes are small.  Bone marrow signal is normal.     The extensor mechanism is otherwise intact.  Physiologic amount of fluid lies in right knee joint.  Lobular foci of fluid like increased T2 signal superficial to medial head of gastrocnemius origin and along posterior aspect of PCL suggest small synovial cysts.  Popliteal  fossa otherwise unremarkable.     Impression:  1. Linear horizontal intermediate signal in medial meniscal body communicating with the meniscal apex characteristic horizontal tear.  2. Horizontal tear of body and anterior horn of lateral meniscus.  3. Moderate to severe right knee patellofemoral compartment osteoarthrosis with mild osteoarthrosis in the medial and lateral compartments.    Prior Therapy: Chiro  Social History: Alondra reports lives with  and 3 dogs, 1 story home   Occupation: RN (travel RN)  Prior Level of Function: no limitations  Current Level of Function: limited with weight bearing movements due to pain    Pain:  Current 0/10, worst 10/10, best 0/10   Location:  stabbing and burning, throbbing Left knee.  Right knee is stabbing and burning under and behind patella.  Description: Aching, Burning, Throbbing, Grabbing, Tight, and Sharp  Aggravating Factors: Sitting, Standing, Laying, Bending, Walking, Night Time, Morning, Extension, and Flexing  Easing Factors:  Tylenol, Mobic, knee brace, ice, reposition    Patients goals: Return to employment according to previous level of function, [place] for Son's wedding     Medical History:   Past Medical History:   Diagnosis Date    GERD (gastroesophageal reflux disease)     Hyperlipidemia     PONV (postoperative nausea and vomiting)     Sinus infection     Sleep apnea     uses cpap    Thyroid disease     Vertigo        Surgical History:   Alnodra Putnam  has a past surgical history that includes TONSILLECTOMY, ADENOIDECTOMY; Dilation and curettage of uterus;  section; Tubal ligation; Nasal septoplasty (Bilateral, 2021); Adenoidectomy (N/A, 2021); Laparoscopic salpingo-oophorectomy (Left, 2022); Hysteroscopy with dilation and curettage of uterus (N/A, 2024); Colposcopy (N/A, 2024); and Oophorectomy.    Medications:   Alondra has a current medication list which includes the following prescription(s):  alprazolam, benzonatate, cetirizine, cyclobenzaprine, levothyroxine, losartan, meclizine, meloxicam, montelukast, multivitamin, omeprazole, pravastatin, progesterone, spironolactone, and zepbound.    Allergies:   Review of patient's allergies indicates:  No Known Allergies       OBJECTIVE     Posture/alignment:   standing with increased wt shift to Right, Left knee is flexed with mid to forefoot contact.  Using the knee brace with weight bearing.  Without knee brace this is more exaggerated.    Gait:  walking without AD with heel to toe pattern on Right, increased wt shift to Right.  Walks with Left antalgic gait with Left knee mildly flexed ~10-15* with forefoot contact into Left stance phase (not able to tolerate heel strike)    Range of Motion:     Left  Right    Knee Extension  0/2  0/2    Knee Flexion  120/129 (6/10 anterior knee pain at EROM)  123/134 (8/10 retropatella c/o at EROM)    Tibial Rotation  Moderate/mild restricition with IR  Slight restriction IR     Strength:     Left  Right    Knee Flexion 5/5 5/5    Knee Extension 5/5 5/5    Hip External Rotation 5/5    5/5       Hip Abduction 3-/5 3-/5    Hip Extension 4-/5 4-/5     Recruitment/tone:  Pt displays fair tone Left  Lower Extremity while performing long sitting quad set.  Good tone Right with quad set.    SLR:  Pt able to perform SLR Left  Lower Extremity with 10 knee extension lag.  5* lag Right.    Patella glide: increased Left lateral glide mobility with pain (7-8/10 medial to patella).  With manual lateral patella stability during quad set she states her medial knee pain drops to 2/10.  Increased lateral tracking Bilateral patella with quad set.    Palpation:  Increased tone and tenderness to palpation noted Left>Right distal Iliotibial Band, distal lateral quad.            Limitation/Restriction for FOTO 29 Survey    Therapist reviewed FOTO scores for Alondra Putnam on 6/5/2025.   FOTO documents entered into [employer] - see Media section.    Intake  Score: ***%.  Predicted ***%.         TREATMENT     Total Treatment time (time-based codes) separate from Evaluation: *** minutes      Alondra received the treatments listed below:      manual therapy techniques: for *** minutes, including:  Sidelying trigger point release, TPG, IASTM to distal Iliotibial Band and quad  Medial patella glides/stretching of lateral tissues  Sidelying manual stretching of quad and Iliotibial Band     therapeutic exercises for *** minutes including:  ***    neuromuscular re-education activities for *** minutes. The following activities were included:  ***    therapeutic activities for ***  minutes, including:  Eval findings with education and demonstration regarding posture, postural awareness, joint and soft tissue deficits, body mechanics, arthrokinematics, diagnosis, functional limitations related to deficits and diagnosis.     ***    gait training to improve functional mobility and safety for ***  minutes, including:  ***    direct contact modalities after being cleared for contraindications: {AMB PT PROGRESS DIRECT CONTACT MODES:38344}    supervised modalities after being cleared for contradictions: {AMB PT SUPERVISED MODES:51614}    hot pack for *** minutes to ***.    cold pack for *** minutes to ***.        PATIENT EDUCATION AND HOME EXERCISES       ASSESSMENT     Alondra is a 54 y.o. female referred to outpatient Physical Therapy with a medical diagnosis of Acute medial meniscus tear, Left , initial encounter.   Patient presents with increased c/o Bilateral knee pain due to slip and fall at work in February 2025.  She has been placed in a long knee brace Left Lower Extremity.  She presents with decreased knee ROM, quad and hip weakness, quad and Iliotibial Band tightness, increased lateral resting position of patella with increased lateral tracking with quad activation, decreased weight bearing tolerance, altered gait, + MRI, inability to return to work at this time.    The patient's  current job specific task deficits include the following: ***    .   Patient will benefit from skilled outpatient Physical Therapy to address the deficits stated above and in the chart below, provide patient /family education, to maximize patient's level of independence, and to address work-related functional deficits for their job as a ***.    Plan of care discussed with patient: Yes  Patient's spiritual, cultural and educational needs considered and patient is agreeable to the plan of care and goals as stated below:     Anticipated Barriers for therapy: Schedule conflicts, transportation, pain, guarding, tolerance, endurance, posture, postural awareness, joint and soft tissue deficits, ***    {OTW Occ Barriers:53287}    Pt will return to work {OTW Occ Restrictions:08709}    Goals:  ***    PLAN   Plan of care Certification: 6/5/2025 to 8/29/2025.      Upon discharge from further skilled PT intervention it will determined if the need for a work conditioning program or Functional Capacity Evaluation is required to allow the injured worker to return to work with full potential achieved, continued improvement with body mechanics with advanced functional activities, and further minimize future work-related injuries.     Physical therapist and physical therapy assistant(s) will met face to face to discuss patient's treatment plan and progress towards established goals. Pt will be seen by a physical therapist minimally every 6th visit or every 30 days.    Outpatient Physical Therapy 1-3 times weekly for 6-8 weeks to include the following interventions: Electrical Stimulation attended/unattended, Gait Training, Manual Therapy, Moist Heat/ Ice, Neuromuscular Re-ed, Orthotic Management and Training, Patient Education, Therapeutic Activities, Therapeutic Exercise, dry needling and Ultrasound.       Kian Castro, PT      I CERTIFY THE NEED FOR THESE SERVICES FURNISHED UNDER THIS PLAN OF TREATMENT AND WHILE UNDER MY CARE    Physician's comments:     Physician's Signature: ___________________________________________________

## 2025-06-10 ENCOUNTER — CLINICAL SUPPORT (OUTPATIENT)
Dept: REHABILITATION | Facility: HOSPITAL | Age: 55
End: 2025-06-10
Payer: COMMERCIAL

## 2025-06-10 DIAGNOSIS — M25.562 CHRONIC PAIN OF LEFT KNEE: Primary | ICD-10-CM

## 2025-06-10 DIAGNOSIS — M25.662 KNEE STIFFNESS, LEFT: ICD-10-CM

## 2025-06-10 DIAGNOSIS — R26.9 ALTERED GAIT: ICD-10-CM

## 2025-06-10 DIAGNOSIS — R29.898 WEAKNESS OF LEFT HIP: ICD-10-CM

## 2025-06-10 DIAGNOSIS — M62.81 WEAKNESS OF LEFT QUADRICEPS MUSCLE: ICD-10-CM

## 2025-06-10 DIAGNOSIS — G89.29 CHRONIC PAIN OF LEFT KNEE: Primary | ICD-10-CM

## 2025-06-10 PROCEDURE — 97112 NEUROMUSCULAR REEDUCATION: CPT | Mod: PN

## 2025-06-10 PROCEDURE — 97140 MANUAL THERAPY 1/> REGIONS: CPT | Mod: PN

## 2025-06-10 NOTE — PROGRESS NOTES
Outpatient Rehab    Physical Therapy Evaluation    Patient Name: Alondra Putnam  MRN: [id number]  YOB: 1970  Encounter Date: 6/5/2025    Therapy Diagnosis:   Encounter Diagnoses   Name Primary?    Chronic pain of left knee Yes    Knee stiffness, left     Weakness of left quadriceps muscle     Weakness of left hip     Altered gait     Sprain of other ligament of left knee, initial encounter      Physician: James Edwards,*    Physician Orders: Eval and Treat  Medical Diagnosis: Acute medial meniscus tear, right, initial encounter  Sprain of other ligament of left knee, initial encounter  Surgical Diagnosis: Not applicable for this Episode   Surgical Date: Not applicable for this Episode    Visit # / Visits Authorized:  1 / 12  Insurance Authorization Period: 6/5/2025 to 6/5/2026  Date of Evaluation: 6/5/2025  Plan of Care Certification: 6/5/2025 to 8/1/2025     Time In: 1300   Time Out: 1400  Total Time (in minutes): 60   Total Billable Time (in minutes): 55    Intake Outcome Measure for FOTO Survey    Therapist reviewed FOTO scores for Alondra Putnam on 6/5/2025.   FOTO report - see Media section or FOTO account episode details.     Intake Score: 29%    Precautions:     GERD, vertigo.        Subjective   History of Present Illness  Alondra is a 54 y.o. female who reports to physical therapy with a chief concern of knee pain.     The patient reports a medical diagnosis of Acute medial meniscus tear, Left , initial encounter (S83.242A). The patient has experienced this issue since 05/28/25.   Diagnostic tests related to this condition: X-ray and MRI studies.   MRI Studies Details: MRI Left knee FINDINGS:    5/29/2025  ACL, PCL, MCL, and LCL complex are intact.     Linear horizontal intermediate signal affecting medial meniscal body communicates with the meniscal apex, characteristic of horizontal tear (series 3, images 16 and 17).  Horizontal tear affects body and anterior horn of lateral meniscus,  communicating with femoral surface near the meniscal apex.     Mild diffuse cartilage degeneration occurs in the medial and lateral compartments.  Full-thickness cartilage loss occurs on medial patella facet with moderate cartilage thinning on lateral patella facet.  Areas of full-thickness cartilage loss are suggested in the femoral trochlea.  Tricompartmental marginal osteophytes are small.  Bone marrow signal is normal.     The extensor mechanism is otherwise intact.  Physiologic amount of fluid lies in right knee joint.  Lobular foci of fluid like increased T2 signal superficial to medial head of gastrocnemius origin and along posterior aspect of PCL suggest small synovial cysts.  Popliteal fossa otherwise unremarkable.     Impression:  1. Linear horizontal intermediate signal in medial meniscal body communicating with the meniscal apex characteristic horizontal tear.  2. Horizontal tear of body and anterior horn of lateral meniscus.  3. Moderate to severe right knee patellofemoral compartment osteoarthrosis with mild osteoarthrosis in the medial and lateral compartments.  X-Ray Details: XR Left knee FINDINGS:    5/21/2025  There is osteopenia and tricompartmental osteophyte formation in both knees.  There is, at most, mild right knee medial tibiofemoral joint space narrowing with PA flexion positioning of the knees.  No acute fracture, dislocation, or osseous destructive process appreciated radiographically.  No chondrocalcinosis.  No left knee joint effusion.    History of Present Condition/Illness: she slipped on some water while at work back in February. 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 Only previous treatment with some chiropractic care. Patient complains of left sharp stabbing pain when she puts weight on it. Has been using a brace. Pain keeps her up at night. Uses Flexeril. Was just given Mobic about a week ago which does seem to help. Rates her pain is an 8/10. Denies  locking or instability--------------------------------------------------------------Today Alondra reports: she was up in [place] doing a discharge for a pt.  She was pushing a w/c when she hit some water and her feet slipped.  The Left knee hyperextended as she slipped onto the Right knee and then went backwards hyperflexing the Right knee.  Later after the injury she was walking and the Left knee gave out on her.  She has a long knee brace for the Left knee.  The Left knee is worse than Right knee. The Left knee hurts along the medial knee, the Right knee hurts behind the patella.  Since onset the pain is the same Right knee, worsening Left knee.  Left knee is worse in that pain can reduce weight bearing tolerance, sharp pain.  Pain is worse with weight bearing and non-weight bearing extension and flexion.  Describes stabbing and burning, throbbing Left knee.  Right knee is stabbing and burning under and behind patella.     Pain     Patient reports a current pain level of 0/10. Pain at best is reported as 0/10. Pain at worst is reported as 10/10.   Location: L knee medial joint and around patella, R knee below and behind patella  Clinical Progression (since onset): Worsening  Pain Qualities: Burning, Aching, Discomfort, Knife-like, Tightness, Tenderness, Throbbing, Sharp, Pressure  Pain-Relieving Factors: Medications - over-the-counter, Medications - prescription, Activity modification, Change in position, Compression, Elevation, Ice, Rest, Other (Comment)  Other Pain-Relieving Factors: support from knee brace  Pain-Aggravating Factors: Bending, Kneeling, Lifting, Movement, Running, Sitting, Sleeping, Squatting, Stair climbing, Standing, Straightening, Transfers, Twisting, Walking         Living Arrangements  Alondra reports lives with  and 3 dogs, 1 story home      Employment  Employment Status: On leave   RN (travel RN)      Past Medical History/Physical Systems Review:   Alondra Putnam  has a past medical history  of GERD (gastroesophageal reflux disease), Hyperlipidemia, PONV (postoperative nausea and vomiting), Sinus infection, Sleep apnea, Thyroid disease, and Vertigo.    Alondra Putnam  has a past surgical history that includes TONSILLECTOMY, ADENOIDECTOMY; Dilation and curettage of uterus;  section; Tubal ligation; Nasal septoplasty (Bilateral, 2021); Adenoidectomy (N/A, 2021); Laparoscopic salpingo-oophorectomy (Left, 2022); Hysteroscopy with dilation and curettage of uterus (N/A, 2024); Colposcopy (N/A, 2024); and Oophorectomy.    Alondra has a current medication list which includes the following prescription(s): alprazolam, benzonatate, cetirizine, cyclobenzaprine, levothyroxine, losartan, meclizine, meloxicam, montelukast, multivitamin, omeprazole, pravastatin, progesterone, spironolactone, and zepbound.    Review of patient's allergies indicates:  No Known Allergies     Objective   Posture                 standing with increased wt shift to Right, Left knee is flexed with mid to forefoot contact.  Using the knee brace with weight bearing.  Without knee brace this is more exaggerated.     Knee Palpation      Increased tone and tenderness to palpation noted Left>Right distal Iliotibial Band, distal lateral quad.           Increased tone and tenderness to palpation noted Left>Right distal Iliotibial Band, distal lateral quad.            Knee Range of Motion   Right Knee   Active (deg) Passive (deg) Pain   Flexion 123 134 Yes (8/10 retropatella c/o at EROM)   Extension 0 2         Left Knee   Active (deg) Passive (deg) Pain   Flexion 120 129 Yes (6/10 anterior knee pain at EROM)   Extension 0 2         Left with moderate/mild restrictions with tibial Internal Rotation (slight on Right)              Hip Strength - Planes of Motion   Right Strength Right Pain Left Strength Left  Pain   Flexion (L2) 5   5     Extension 3   3     ABduction 3-   3-     ADduction           Internal Rotation            External Rotation 4+   4+         Knee Strength   Right Strength Right Pain Left Strength Left  Pain   Flexion (S2) 4+   4+     Prone Flexion           Extension (L3) 4+   4+       Pt displays fair tone Left  Lower Extremity while performing long sitting quad set.  Good tone Right with quad set.------------------------------------- Pt able to perform SLR Left  Lower Extremity with 10 knee extension lag.  5* lag Right.         Knee Patellar Screening        increased Left lateral glide mobility with pain (7-8/10 medial to patella).  With manual lateral patella stability during quad set she states her medial knee pain drops to 2/10.  Increased lateral tracking Bilateral patella with quad set.            Gait Analysis  Gait Analysis Details  walking without AD with heel to toe pattern on Right, increased wt shift to Right.  Walks with Left antalgic gait with Left knee mildly flexed ~10-15* with forefoot contact into Left stance phase (not able to tolerate heel strike)         Treatment:  Manual Therapy  MT 1: Sidelying trigger point release, TPG, IASTM to distal Iliotibial Band and quad f/b manual stretching of quad and Iliotibial Band  MT 2: Medial patella glides/stretching of lateral tissues      Time Entry(in minutes):  Manual Therapy Time Entry: 25    Assessment & Plan   Assessment  Alondra presents with a condition of Moderate complexity.   Presentation of Symptoms: Evolving  Will Comorbidities Impact Care: No       Functional Limitations: Activity tolerance, Ambulating on uneven surfaces, Bed mobility, Carrying objects, Completing self-care activities, Completing work/school activities, Decreased ambulation distance/endurance, Disrupted sleep pattern, Driving, Functional mobility, Gait limitations, Getting off the floor, Pain with ADLs/IADLs, Painful locomotion/ambulation, Participating in leisure activities, Performing household chores, Range of motion, Sitting tolerance, Squatting, Standing tolerance,  Transfers  Impairments: Abnormal gait, Abnormal muscle firing, Abnormal muscle tone, Abnormal or restricted range of motion, Activity intolerance, Impaired balance, Impaired physical strength, Pain with functional activity, Weight-bearing intolerance  Personal Factors Affecting Prognosis: Fear/anxiety, Pain    Patient Goal for Therapy (PT): Return to employment according to previous level of function, [place] for Son's wedding  Prognosis: Fair  Assessment Details: Alondra is a 54 y.o. female referred to outpatient Physical Therapy with a medical diagnosis of Acute medial meniscus tear, Left , initial encounter.  Patient presents with increased c/o Bilateral knee pain due to slip and fall at work in February 2025.  She has been placed in a long knee brace Left Lower Extremity.  She presents with decreased knee ROM, quad and hip weakness, quad and Iliotibial Band tightness, increased lateral resting position of patella with increased lateral tracking with quad activation, decreased weight bearing tolerance, altered gait, + MRI, inability to return to work at this time.     Plan  From a physical therapy perspective, the patient would benefit from: Skilled Rehab Services    Planned therapy interventions include: Therapeutic exercise, Therapeutic activities, Neuromuscular re-education, Manual therapy, Orthotic management and training, and Work conditioning.    Planned modalities to include: Cryotherapy (cold pack), Electrical stimulation - attended, Electrical stimulation - passive/unattended, Phonophoresis, Thermotherapy (hot pack), Ultrasound, and Other (Comment). Dry needling                  This plan was discussed with Patient.   Discussion participants: Agreed Upon Plan of Care  Plan details: Upon discharge from further skilled PT intervention it will determined if the need for a work conditioning program or Functional Capacity Evaluation is required to allow the injured worker to return to work  with full potential achieved, continued improvement with body mechanics with advanced functional activities, and further minimize future work-related injuries.  Physical therapist and physical therapy assistant(s) will met face to face to discuss patient's treatment plan and progress towards established goals. Pt will be seen by a physical therapist minimally every 6th visit or every 30 days. Outpatient Physical Therapy 1-3 times weekly for 6-8 weeks to include the following interventions: Electrical Stimulation attended/unattended, Gait Training, Manual Therapy, Moist Heat/ Ice, Neuromuscular Re-ed, Orthotic Management and Training, Patient Education, Therapeutic Activities, Therapeutic Exercise, dry needling and Ultrasound.              The patient's spiritual, cultural, and educational needs were considered, and the patient is agreeable to the plan of care and goals.           Goals:   Active       Ambulation/movement       Patient will walk without AD, without knee brace without restrictions       Start:  06/05/25    Expected End:  08/01/25               Functional outcome       Patient stated goal: Return to employment according to previous level of function, [place] for Son's wedding        Start:  06/05/25    Expected End:  08/01/25            Patient will demonstrate independence in home program for support of progression       Start:  06/05/25    Expected End:  08/01/25               Maintaining body position       Patient will maintain sitting without pain, without restrictions       Start:  06/05/25    Expected End:  08/01/25            Patient will maintain standing without knee brace with no restrictions       Start:  06/05/25    Expected End:  08/01/25               Pain       Patient will report pain of 1-2/10 demonstrating a reduction of overall pain       Start:  06/05/25    Expected End:  08/01/25               Range of Motion       Patient will achieve left knee ROM of  0-140  degrees        Start:  06/05/25    Expected End:  08/01/25               Strength       Patient will achieve bilateral hip strength of 4+/5       Start:  06/05/25    Expected End:  08/01/25            Patient will achieve bilateral knee strength of 5/5       Start:  06/05/25    Expected End:  08/01/25               Transferring       Patient will display normal transfers from seated surfaces without restrictions       Start:  06/05/25    Expected End:  08/01/25                Kian Castro, PT

## 2025-06-12 ENCOUNTER — CLINICAL SUPPORT (OUTPATIENT)
Dept: REHABILITATION | Facility: HOSPITAL | Age: 55
End: 2025-06-12
Payer: COMMERCIAL

## 2025-06-12 DIAGNOSIS — M62.81 WEAKNESS OF LEFT QUADRICEPS MUSCLE: ICD-10-CM

## 2025-06-12 DIAGNOSIS — R26.9 ALTERED GAIT: ICD-10-CM

## 2025-06-12 DIAGNOSIS — R29.898 WEAKNESS OF LEFT HIP: ICD-10-CM

## 2025-06-12 DIAGNOSIS — M25.662 KNEE STIFFNESS, LEFT: ICD-10-CM

## 2025-06-12 DIAGNOSIS — G89.29 CHRONIC PAIN OF LEFT KNEE: Primary | ICD-10-CM

## 2025-06-12 DIAGNOSIS — M25.562 CHRONIC PAIN OF LEFT KNEE: Primary | ICD-10-CM

## 2025-06-12 PROCEDURE — 97014 ELECTRIC STIMULATION THERAPY: CPT | Mod: PN

## 2025-06-12 PROCEDURE — 97140 MANUAL THERAPY 1/> REGIONS: CPT | Mod: PN

## 2025-06-12 NOTE — PROGRESS NOTES
Outpatient Rehab    Physical Therapy Visit    Patient Name: Alondra Putnam  MRN: [id number]  YOB: 1970  Encounter Date: 2025    Therapy Diagnosis:   Encounter Diagnoses   Name Primary?    Chronic pain of left knee Yes    Knee stiffness, left     Weakness of left quadriceps muscle     Weakness of left hip     Altered gait      Physician: James Edwards,*    Physician Orders: Eval and Treat  Medical Diagnosis: Acute medial meniscus tear, right, initial encounter  Sprain of other ligament of left knee, initial encounter  Surgical Diagnosis: Not applicable for this Episode   Surgical Date: Not applicable for this Episode    Visit # / Visits Authorized:  3 / 12  Insurance Authorization Period: 2025 to 2026  Date of Evaluation: 2025  Plan of Care Certification: 2025 to 2025      PT/PTA: PT   Number of PTA visits since last PT visit:0  Time In: 1400   Time Out: 1500  Total Time (in minutes): 60   Total Billable Time (in minutes): 55    FOTO:  Intake Score: 29%  Survey Score 2:  %  Survey Score 3:  %    Precautions:     GERD, vertigo.        Subjective   For some reason her knee is flared up today.  She did ok after the last visit.  went home and did ice it, which helped..  Pain reported as 6/10. L knee is about 8/10, R knee is 4-5/10.    Objective            Treatment:  Manual Therapy  MT 1: supine and sidelying trigger point release, TPG, IASTM to distal Iliotibial Band and quad f/b manual stretching of quad and Iliotibial Band  MT 2: Medial patella glides/stretching of lateral tissues  MT 3: Tiise aseesments prior to, during, and following DN  Modalities  Electrical Stimulation (Parameters): EDN x 10'  Dry Needling: The pt expressed desire to receive trigger point dry needling today. Informed and written consent was obtained including benefits and risks of treatments, copy of signed consent form will be placed in patient's medical record. Verification of pt name, , and  site of treatment was performed prior to treatment. Pt was positioned in supine with head elevated and knees elevated to comfort to receive trigger point dry needling to Bilateral distal VL/ITB near insertion to patella with 40 mm serin J type needles. LTR achieved and pt reported familiar pain reproduction. Electro dry needling x 10 minutes.  The pt did not have adverse response to treatment. This was followed by STM to further decrease trigger point activity and pain, patient tolerated well. Post treatment patient demonstrated decreased pain per report following. Pt was educated to drink plenty of water following treatment to help with muscle soreness. Pt was educated to contact therapist post treatment as needed.      Time Entry(in minutes):  E-Stim (Unattended) Time Entry: 10  Manual Therapy Time Entry: 45    Assessment & Plan   Assessment: Pt returning with continued c/o knee and patella c/o.  Some slight improvement in mobility and tracking noted after MT and DN today as compared to prevous visits.  Able to perform ROM of her knee, and walk with some improvements verbalized in her pain in general, decreased pressure at her patella, continued L knee medial joint line pain.  Evaluation/Treatment Tolerance: Patient tolerated treatment well    The patient will continue to benefit from skilled outpatient physical therapy in order to address the deficits listed in the problem list on the initial evaluation, provide patient and family education, and maximize the patients level of independence in the home and community environments.     The patient's spiritual, cultural, and educational needs were considered, and the patient is agreeable to the plan of care and goals.           Plan: Continue to progress as tolerated    Goals:   Active       Ambulation/movement       Patient will walk without AD, without knee brace without restrictions       Start:  06/05/25    Expected End:  08/01/25               Functional outcome        Patient stated goal: Return to employment according to previous level of function, [place] for Son's wedding        Start:  06/05/25    Expected End:  08/01/25            Patient will demonstrate independence in home program for support of progression       Start:  06/05/25    Expected End:  08/01/25               Maintaining body position       Patient will maintain sitting without pain, without restrictions       Start:  06/05/25    Expected End:  08/01/25            Patient will maintain standing without knee brace with no restrictions       Start:  06/05/25    Expected End:  08/01/25               Pain       Patient will report pain of 1-2/10 demonstrating a reduction of overall pain       Start:  06/05/25    Expected End:  08/01/25               Range of Motion       Patient will achieve left knee ROM of  0-140 degrees        Start:  06/05/25    Expected End:  08/01/25               Strength       Patient will achieve bilateral hip strength of 4+/5       Start:  06/05/25    Expected End:  08/01/25            Patient will achieve bilateral knee strength of 5/5       Start:  06/05/25    Expected End:  08/01/25               Transferring       Patient will display normal transfers from seated surfaces without restrictions       Start:  06/05/25    Expected End:  08/01/25                Kian Castro, PT

## 2025-06-12 NOTE — PROGRESS NOTES
Outpatient Rehab    Physical Therapy Visit    Patient Name: Alondra Putnam  MRN: [id number]  YOB: 1970  Encounter Date: 6/10/2025    Therapy Diagnosis:   Encounter Diagnoses   Name Primary?    Chronic pain of left knee Yes    Knee stiffness, left     Weakness of left quadriceps muscle     Weakness of left hip     Altered gait      Physician: James Edwards,*    Physician Orders: Eval and Treat  Medical Diagnosis: Acute medial meniscus tear, right, initial encounter  Sprain of other ligament of left knee, initial encounter  Surgical Diagnosis: Not applicable for this Episode   Surgical Date: Not applicable for this Episode    Visit # / Visits Authorized:  3 / 12  Insurance Authorization Period: 5/28/2025 to 5/28/2026  Date of Evaluation: 6/5/2025  Plan of Care Certification: 6/12/2025 to 8/1/2025      PT/PTA: PT   Number of PTA visits since last PT visit:0  Time In: 1400   Time Out: 1500  Total Time (in minutes): 60   Total Billable Time (in minutes): 55    FOTO:  Intake Score: 29%  Survey Score 2:  %  Survey Score 3:  %    Precautions:     GERD, vertigo.        Subjective   no change in her c/o overall since the IE.  She did notice she was walking a little better as she was getting a slight improvement in her heel strike with slight improvement in wt shifting during gait..  Pain reported as 4/10.      Objective            Treatment:  Manual Therapy  MT 1: supine and sidelying trigger point release, TPG, IASTM to distal Iliotibial Band and quad f/b manual stretching of quad and Iliotibial Band  MT 2: Medial patella glides/stretching of lateral tissues  Balance/Neuromuscular Re-Education  NMR 1: supine:  -QS + GS,  -GS + bridge,  - alternating TKE with large bolster,  - GS + bridge with lg bolster,  -PPT + ball squeeze,  - PPT + clam with Y loop,  - alternaing heel taps    Time Entry(in minutes):  Manual Therapy Time Entry: 30  Neuromuscular Re-Education Time Entry: 25    Assessment & Plan    Assessment: Pt returning with continued c/o knee and patella c/o.  Some slight improvement in mobility and tracking noted after MT today as compared to IE.  Tolerated exercises but needed frequent rest breaks, cueing.  Evaluation/Treatment Tolerance: Patient tolerated treatment well    The patient will continue to benefit from skilled outpatient physical therapy in order to address the deficits listed in the problem list on the initial evaluation, provide patient and family education, and maximize the patients level of independence in the home and community environments.     The patient's spiritual, cultural, and educational needs were considered, and the patient is agreeable to the plan of care and goals.           Plan: Continue to progress as tolerated    Goals:   Active       Ambulation/movement       Patient will walk without AD, without knee brace without restrictions       Start:  06/05/25    Expected End:  08/01/25               Functional outcome       Patient stated goal: Return to employment according to previous level of function, [place] for Son's wedding        Start:  06/05/25    Expected End:  08/01/25            Patient will demonstrate independence in home program for support of progression       Start:  06/05/25    Expected End:  08/01/25               Maintaining body position       Patient will maintain sitting without pain, without restrictions       Start:  06/05/25    Expected End:  08/01/25            Patient will maintain standing without knee brace with no restrictions       Start:  06/05/25    Expected End:  08/01/25               Pain       Patient will report pain of 1-2/10 demonstrating a reduction of overall pain       Start:  06/05/25    Expected End:  08/01/25               Range of Motion       Patient will achieve left knee ROM of  0-140 degrees        Start:  06/05/25    Expected End:  08/01/25               Strength       Patient will achieve bilateral hip  strength of 4+/5       Start:  06/05/25    Expected End:  08/01/25            Patient will achieve bilateral knee strength of 5/5       Start:  06/05/25    Expected End:  08/01/25               Transferring       Patient will display normal transfers from seated surfaces without restrictions       Start:  06/05/25    Expected End:  08/01/25                Kian Castro, PT

## 2025-06-18 ENCOUNTER — CLINICAL SUPPORT (OUTPATIENT)
Dept: REHABILITATION | Facility: HOSPITAL | Age: 55
End: 2025-06-18
Payer: COMMERCIAL

## 2025-06-18 DIAGNOSIS — R29.898 WEAKNESS OF LEFT HIP: ICD-10-CM

## 2025-06-18 DIAGNOSIS — M25.562 CHRONIC PAIN OF LEFT KNEE: Primary | ICD-10-CM

## 2025-06-18 DIAGNOSIS — M62.81 WEAKNESS OF LEFT QUADRICEPS MUSCLE: ICD-10-CM

## 2025-06-18 DIAGNOSIS — G89.29 CHRONIC PAIN OF LEFT KNEE: Primary | ICD-10-CM

## 2025-06-18 DIAGNOSIS — R26.9 ALTERED GAIT: ICD-10-CM

## 2025-06-18 DIAGNOSIS — M25.662 KNEE STIFFNESS, LEFT: ICD-10-CM

## 2025-06-18 PROCEDURE — 97112 NEUROMUSCULAR REEDUCATION: CPT | Mod: PN

## 2025-06-18 PROCEDURE — 97140 MANUAL THERAPY 1/> REGIONS: CPT | Mod: PN

## 2025-06-18 PROCEDURE — 97014 ELECTRIC STIMULATION THERAPY: CPT | Mod: PN

## 2025-06-18 NOTE — PROGRESS NOTES
Outpatient Rehab    Physical Therapy Visit    Patient Name: Alondra Putnam  MRN: [id number]  YOB: 1970  Encounter Date: 6/18/2025    Therapy Diagnosis:   Encounter Diagnoses   Name Primary?    Chronic pain of left knee Yes    Knee stiffness, left     Weakness of left quadriceps muscle     Weakness of left hip     Altered gait      Physician: James Edwards,*    Physician Orders: Eval and Treat  Medical Diagnosis: Acute medial meniscus tear, right, initial encounter  Sprain of other ligament of left knee, initial encounter  Surgical Diagnosis: Not applicable for this Episode   Surgical Date: Not applicable for this Episode  Days Since Last Surgery: Not applicable for this Episode    Visit # / Visits Authorized:  4 / 12  Insurance Authorization Period: 5/28/2025 to 5/28/2026  Date of Evaluation: 6/5/2025  Plan of Care Certification: 6/12/2025 to 8/1/2025      PT/PTA: PT   Number of PTA visits since last PT visit:0  Time In: 1300   Time Out: 1400  Total Time (in minutes): 60   Total Billable Time (in minutes): 55    FOTO:  Intake Score: 29%  Survey Score 2:  %  Survey Score 3:  %    Precautions:     GERD, vertigo.        Subjective   R knee is feeling somewhat better but L knee still hurts a lot..    L knee is about 8/10, R knee is 2-3/10.    Objective            Treatment:  Manual Therapy  MT 1: supine and sidelying trigger point release, TPG, IASTM to distal Iliotibial Band and quad f/b manual stretching of quad and Iliotibial Band  MT 2: Medial patella glides/stretching of lateral tissues  MT 3: Tissue aseesments prior to, during, and following DN  Balance/Neuromuscular Re-Education  NMR 1: supine:  -QS + GS,  -GS + bridge,  - alternating TKE with large bolster,  - GS + bridge with lg bolster,  -PPT + ball squeeze,  - PPT + clam with Y loop,  - marching with Y loop,  - alternaing heel taps  Modalities  Electrical Stimulation (Parameters): EDN x 10'  Dry Needling: The pt expressed desire to  receive trigger point dry needling today. Informed and written consent was obtained including benefits and risks of treatments, copy of signed consent form will be placed in patient's medical record. Verification of pt name, , and site of treatment was performed prior to treatment. Pt was positioned in supine with head elevated and knees elevated to comfort to receive trigger point dry needling to Bilateral distal VL/ITB near insertion to patella with 40 mm serin J type needles. LTR achieved and pt reported familiar pain reproduction. Electro dry needling x 10 minutes.  The pt did not have adverse response to treatment. This was followed by STM to further decrease trigger point activity and pain, patient tolerated well. Post treatment patient demonstrated decreased pain per report following. Pt was educated to drink plenty of water following treatment to help with muscle soreness. Pt was educated to contact therapist post treatment as needed.    Time Entry(in minutes):  E-Stim (Unattended) Time Entry: 10  Manual Therapy Time Entry: 20  Neuromuscular Re-Education Time Entry: 25    Assessment & Plan   Assessment: Pt returning with continued c/o knee and patella c/o.  Some slight improvement in mobility and tracking noted after MT and DN today as compared to prevous visits.  Right knee pain has improved per pt with overall pain in Left medial knee and joint line the same but some mild improvements with patella c/o.    Evaluation/Treatment Tolerance: Patient tolerated treatment well    The patient will continue to benefit from skilled outpatient physical therapy in order to address the deficits listed in the problem list on the initial evaluation, provide patient and family education, and maximize the patients level of independence in the home and community environments.     The patient's spiritual, cultural, and educational needs were considered, and the patient is agreeable to the plan of care and goals.            Plan: Continue to progress as tolerated    Goals:   Active       Ambulation/movement       Patient will walk without AD, without knee brace without restrictions       Start:  06/05/25    Expected End:  08/01/25               Functional outcome       Patient stated goal: Return to employment according to previous level of function, [place] Son's wedding        Start:  06/05/25    Expected End:  08/01/25            Patient will demonstrate independence in home program for support of progression       Start:  06/05/25    Expected End:  08/01/25               Maintaining body position       Patient will maintain sitting without pain, without restrictions       Start:  06/05/25    Expected End:  08/01/25            Patient will maintain standing without knee brace with no restrictions       Start:  06/05/25    Expected End:  08/01/25               Pain       Patient will report pain of 1-2/10 demonstrating a reduction of overall pain       Start:  06/05/25    Expected End:  08/01/25               Range of Motion       Patient will achieve left knee ROM of  0-140 degrees        Start:  06/05/25    Expected End:  08/01/25               Strength       Patient will achieve bilateral hip strength of 4+/5       Start:  06/05/25    Expected End:  08/01/25            Patient will achieve bilateral knee strength of 5/5       Start:  06/05/25    Expected End:  08/01/25               Transferring       Patient will display normal transfers from seated surfaces without restrictions       Start:  06/05/25    Expected End:  08/01/25                Kian Castro PT

## 2025-06-20 ENCOUNTER — CLINICAL SUPPORT (OUTPATIENT)
Dept: REHABILITATION | Facility: HOSPITAL | Age: 55
End: 2025-06-20
Payer: COMMERCIAL

## 2025-06-20 DIAGNOSIS — R26.9 ALTERED GAIT: ICD-10-CM

## 2025-06-20 DIAGNOSIS — M62.81 WEAKNESS OF LEFT QUADRICEPS MUSCLE: ICD-10-CM

## 2025-06-20 DIAGNOSIS — R29.898 WEAKNESS OF LEFT HIP: ICD-10-CM

## 2025-06-20 DIAGNOSIS — M25.662 KNEE STIFFNESS, LEFT: ICD-10-CM

## 2025-06-20 DIAGNOSIS — G89.29 CHRONIC PAIN OF LEFT KNEE: Primary | ICD-10-CM

## 2025-06-20 DIAGNOSIS — M25.562 CHRONIC PAIN OF LEFT KNEE: Primary | ICD-10-CM

## 2025-06-20 PROCEDURE — 97014 ELECTRIC STIMULATION THERAPY: CPT | Mod: PN

## 2025-06-20 PROCEDURE — 97112 NEUROMUSCULAR REEDUCATION: CPT | Mod: PN

## 2025-06-20 PROCEDURE — 97140 MANUAL THERAPY 1/> REGIONS: CPT | Mod: PN

## 2025-06-20 NOTE — PROGRESS NOTES
Outpatient Rehab    Physical Therapy Visit    Patient Name: Alondra Putnam  MRN: [id number]  YOB: 1970  Encounter Date: 6/20/2025    Therapy Diagnosis:   Encounter Diagnoses   Name Primary?    Chronic pain of left knee Yes    Knee stiffness, left     Weakness of left quadriceps muscle     Weakness of left hip     Altered gait      Physician: James Edwards,*    Physician Orders: Eval and Treat  Medical Diagnosis: Acute medial meniscus tear, right, initial encounter  Sprain of other ligament of left knee, initial encounter  Surgical Diagnosis: Not applicable for this Episode   Surgical Date: Not applicable for this Episode  Days Since Last Surgery: Not applicable for this Episode    Visit # / Visits Authorized:  5 / 12  Insurance Authorization Period: 5/28/2025 to 5/28/2026  Date of Evaluation: 6/5/2025  Plan of Care Certification: 6/12/2025 to 8/1/2025      PT/PTA: PT   Number of PTA visits since last PT visit:0  Time In: 1000   Time Out: 1100  Total Time (in minutes): 60   Total Billable Time (in minutes): 55    FOTO:  Intake Score: 29%  Survey Score 2:  %  Survey Score 3:  %    Precautions:     GERD, vertigo.        Subjective   R knee is feeling somewhat better but L knee still hurts a lot at joint line and medially..    L knee is about 5/10, R knee is 2-3/10.    Objective            Treatment:  Balance/Neuromuscular Re-Education  NMR 1: supine:  -QS + GS,  -GS + bridge,  - alternating TKE with large bolster,  - GS + bridge with lg bolster,  -PPT + ball squeeze,  - PPT + clam with Y loop,  - marching with Y loop,  - alternaing heel taps x 2' each,  NMR 2: Supine:  SLR 2 x 10 each  Modalities  Electrical Stimulation (Parameters): EDN x 10'  Dry Needling: The pt expressed desire to receive trigger point dry needling today. Informed and written consent was obtained including benefits and risks of treatments, copy of signed consent form will be placed in patient's medical record. Verification of  pt name, , and site of treatment was performed prior to treatment. Pt was positioned in supine with head elevated and knees elevated to comfort to receive trigger point dry needling to L distal VL/ITB near insertion to patella with 40 mm serin J type needles. LTR achieved and pt reported familiar pain reproduction. Electro dry needling x 10 minutes.  The pt did not have adverse response to treatment. This was followed by STM to further decrease trigger point activity and pain, patient tolerated well. Post treatment patient demonstrated decreased pain per report following. Pt was educated to drink plenty of water following treatment to help with muscle soreness. Pt was educated to contact therapist post treatment as needed.    Time Entry(in minutes):  E-Stim (Unattended) Time Entry: 10  Manual Therapy Time Entry: 20  Neuromuscular Re-Education Time Entry: 25    Assessment & Plan   Assessment: Pt returning with continued c/o knee and patella c/o.  Some slight improvement in mobility and tracking noted after MT and DN today as compared to prevous visits.  Right knee pain has improved per pt with overall pain in Left medial knee and joint line the same but some mild improvements with patella c/o.    Evaluation/Treatment Tolerance: Patient tolerated treatment well    The patient will continue to benefit from skilled outpatient physical therapy in order to address the deficits listed in the problem list on the initial evaluation, provide patient and family education, and maximize the patients level of independence in the home and community environments.     The patient's spiritual, cultural, and educational needs were considered, and the patient is agreeable to the plan of care and goals.           Plan: Continue to progress as tolerated    Goals:   Active       Ambulation/movement       Patient will walk without AD, without knee brace without restrictions       Start:  25    Expected End:  25                Functional outcome       Patient stated goal: Return to employment according to previous level of function, [place] Son's wedding        Start:  06/05/25    Expected End:  08/01/25            Patient will demonstrate independence in home program for support of progression       Start:  06/05/25    Expected End:  08/01/25               Maintaining body position       Patient will maintain sitting without pain, without restrictions       Start:  06/05/25    Expected End:  08/01/25            Patient will maintain standing without knee brace with no restrictions       Start:  06/05/25    Expected End:  08/01/25               Pain       Patient will report pain of 1-2/10 demonstrating a reduction of overall pain       Start:  06/05/25    Expected End:  08/01/25               Range of Motion       Patient will achieve left knee ROM of  0-140 degrees        Start:  06/05/25    Expected End:  08/01/25               Strength       Patient will achieve bilateral hip strength of 4+/5       Start:  06/05/25    Expected End:  08/01/25            Patient will achieve bilateral knee strength of 5/5       Start:  06/05/25    Expected End:  08/01/25               Transferring       Patient will display normal transfers from seated surfaces without restrictions       Start:  06/05/25    Expected End:  08/01/25                Kian Castro, PT

## 2025-06-24 ENCOUNTER — CLINICAL SUPPORT (OUTPATIENT)
Dept: REHABILITATION | Facility: HOSPITAL | Age: 55
End: 2025-06-24
Payer: COMMERCIAL

## 2025-06-24 DIAGNOSIS — M62.81 WEAKNESS OF LEFT QUADRICEPS MUSCLE: ICD-10-CM

## 2025-06-24 DIAGNOSIS — G89.29 CHRONIC PAIN OF LEFT KNEE: Primary | ICD-10-CM

## 2025-06-24 DIAGNOSIS — R26.9 ALTERED GAIT: ICD-10-CM

## 2025-06-24 DIAGNOSIS — M25.662 KNEE STIFFNESS, LEFT: ICD-10-CM

## 2025-06-24 DIAGNOSIS — R29.898 WEAKNESS OF LEFT HIP: ICD-10-CM

## 2025-06-24 DIAGNOSIS — M25.562 CHRONIC PAIN OF LEFT KNEE: Primary | ICD-10-CM

## 2025-06-24 PROCEDURE — 97112 NEUROMUSCULAR REEDUCATION: CPT | Mod: PN

## 2025-06-24 PROCEDURE — 97140 MANUAL THERAPY 1/> REGIONS: CPT | Mod: PN

## 2025-06-24 NOTE — PROGRESS NOTES
Outpatient Rehab    Physical Therapy Visit    Patient Name: Alondra Putnam  MRN: [id number]  YOB: 1970  Encounter Date: 6/24/2025    Therapy Diagnosis:   Encounter Diagnoses   Name Primary?    Chronic pain of left knee Yes    Knee stiffness, left     Weakness of left quadriceps muscle     Weakness of left hip     Altered gait      Physician: James Edwards,*    Physician Orders: Eval and Treat  Medical Diagnosis: Acute medial meniscus tear, right, initial encounter  Sprain of other ligament of left knee, initial encounter  Surgical Diagnosis: Not applicable for this Episode   Surgical Date: Not applicable for this Episode  Days Since Last Surgery: Not applicable for this Episode    Visit # / Visits Authorized:  6 / 12  Insurance Authorization Period: 5/28/2025 to 5/28/2026  Date of Evaluation: 6/5/2025  Plan of Care Certification: 6/12/2025 to 8/1/2025      PT/PTA: PT   Number of PTA visits since last PT visit:0  Time In: 1400   Time Out: 1455  Total Time (in minutes): 55   Total Billable Time (in minutes): 55    FOTO:  Intake Score: 29%  Survey Score 2: 29%  Survey Score 3:  %    Precautions:     GERD, vertigo.        Subjective   R knee is feeling somewhat better but L knee still hurts a lot at joint line and medially..    L knee is about 4/10, R knee is 2/10.    Objective            Treatment:  Manual Therapy  MT 1: supine and sidelying trigger point release, TPG, IASTM to distal Iliotibial Band and quad f/b manual stretching of quad and Iliotibial Band  MT 2: Medial patella glides/stretching of lateral tissues  Balance/Neuromuscular Re-Education  NMR 1: supine:  -QS + GS,  -GS + bridge,  - alternating TKE with large bolster with 1 lb,  - alternating TKE with large bolster into SLR with 1 lb   - GS + bridge with lg bolster,   -PPT + ball squeeze,  - PPT + clam with G loop,  - marching with Y loop,  - alternaing heel taps with 1 lb  x 2' each,  NMR 2: Supine:  SLR with 1 lb 2 x 10  each  Modalities  Cryotherapy (Minutes\Location): 10 min L knee    Time Entry(in minutes):  Hot/Cold Pack Time Entry: 10  Manual Therapy Time Entry: 20  Neuromuscular Re-Education Time Entry: 25    Assessment & Plan   Assessment: Pt returning with continued c/o knee and patella c/o.  Some slight improvement in mobility and tracking noted after manual therapy today (as well as from dry needling previous visits) as compared to prevous visits.  Right knee pain has improved per pt with overall pain in Left medial knee and joint line the same but some mild improvements with patella c/o.    Evaluation/Treatment Tolerance: Patient tolerated treatment well    The patient will continue to benefit from skilled outpatient physical therapy in order to address the deficits listed in the problem list on the initial evaluation, provide patient and family education, and maximize the patients level of independence in the home and community environments.     The patient's spiritual, cultural, and educational needs were considered, and the patient is agreeable to the plan of care and goals.           Plan: Continue to progress as tolerated    Goals:   Active       Ambulation/movement       Patient will walk without AD, without knee brace without restrictions       Start:  06/05/25    Expected End:  08/01/25               Functional outcome       Patient stated goal: Return to employment according to previous level of function, [place] for Son's wedding        Start:  06/05/25    Expected End:  08/01/25            Patient will demonstrate independence in home program for support of progression       Start:  06/05/25    Expected End:  08/01/25               Maintaining body position       Patient will maintain sitting without pain, without restrictions       Start:  06/05/25    Expected End:  08/01/25            Patient will maintain standing without knee brace with no restrictions       Start:  06/05/25    Expected  End:  08/01/25               Pain       Patient will report pain of 1-2/10 demonstrating a reduction of overall pain       Start:  06/05/25    Expected End:  08/01/25               Range of Motion       Patient will achieve left knee ROM of  0-140 degrees        Start:  06/05/25    Expected End:  08/01/25               Strength       Patient will achieve bilateral hip strength of 4+/5       Start:  06/05/25    Expected End:  08/01/25            Patient will achieve bilateral knee strength of 5/5       Start:  06/05/25    Expected End:  08/01/25               Transferring       Patient will display normal transfers from seated surfaces without restrictions       Start:  06/05/25    Expected End:  08/01/25                Kian Castro, PT

## 2025-06-26 ENCOUNTER — CLINICAL SUPPORT (OUTPATIENT)
Dept: REHABILITATION | Facility: HOSPITAL | Age: 55
End: 2025-06-26
Payer: COMMERCIAL

## 2025-06-26 DIAGNOSIS — R29.898 WEAKNESS OF LEFT HIP: ICD-10-CM

## 2025-06-26 DIAGNOSIS — M25.562 CHRONIC PAIN OF LEFT KNEE: Primary | ICD-10-CM

## 2025-06-26 DIAGNOSIS — R26.9 ALTERED GAIT: ICD-10-CM

## 2025-06-26 DIAGNOSIS — M25.662 KNEE STIFFNESS, LEFT: ICD-10-CM

## 2025-06-26 DIAGNOSIS — M62.81 WEAKNESS OF LEFT QUADRICEPS MUSCLE: ICD-10-CM

## 2025-06-26 DIAGNOSIS — G89.29 CHRONIC PAIN OF LEFT KNEE: Primary | ICD-10-CM

## 2025-06-26 PROCEDURE — 97112 NEUROMUSCULAR REEDUCATION: CPT | Mod: PN,CQ

## 2025-06-26 PROCEDURE — 97140 MANUAL THERAPY 1/> REGIONS: CPT | Mod: PN,CQ

## 2025-06-26 PROCEDURE — 97530 THERAPEUTIC ACTIVITIES: CPT | Mod: PN,CQ

## 2025-06-26 NOTE — PROGRESS NOTES
Physical Therapy Visit    Patient Name: Alondra Putnam  MRN: [id number]  YOB: 1970  Encounter Date: 6/26/2025    Therapy Diagnosis:   Encounter Diagnoses   Name Primary?    Chronic pain of left knee Yes    Knee stiffness, left     Weakness of left quadriceps muscle     Weakness of left hip     Altered gait      Physician: James Edwards,*    Physician Orders: Eval and Treat  Medical Diagnosis: Acute medial meniscus tear, right, initial encounter  Sprain of other ligament of left knee, initial encounter  Surgical Diagnosis: Not applicable for this Episode   Surgical Date: Not applicable for this Episode  Days Since Last Surgery: Not applicable for this Episode    Visit # / Visits Authorized:  7 / 12  Insurance Authorization Period: 5/28/2025 to 5/28/2026  Date of Evaluation: 6/5/2025  Plan of Care Certification: 6/12/2025 to 8/1/2025      PT/PTA: PTA   Number of PTA visits since last PT visit:1  Time In: 1505   Time Out: 1600  Total Time (in minutes): 55   Total Billable Time (in minutes): 55    FOTO:  Intake Score: 29%  Survey Score 2: 29%  Survey Score 3:  %    Precautions:     GERD, vertigo.        Subjective   Went to the [employer] yesterday and did a lot of walking, so had a fair amount of soreness but overall did well in comparison. Did not need walker or cane..  Pain reported as 7/10. L knee is about 7/10, R knee is 3/10.    Objective            Treatment:  Manual Therapy  MT 1: sidelying trigger point release, STM to distal Iliotibial Band and quad f/b manual stretching of quad and Iliotibial Band  MT 2: Medial patella glides/stretching of lateral tissues  Balance/Neuromuscular Re-Education  NMR 1: supine:  -QS + GS,  -GS + bridge,  - alternating TKE with large bolster with 1 lb,  - alternating TKE with large bolster into SLR with 1 lb   - GS + bridge with lg bolster,   -PPT + ball squeeze,  - PPT + clam with G loop,  - marching with Y loop,  - alternaing heel taps with 1 lb  x 2'  each,  NMR 2: Supine:  SLR with 1 lb 2 x 10 each  Therapeutic Activity  TA 1: +Standing alternating march x 8 each, then rest due to onset of knee discomfort  TA 2: +Standing heel raises 2x10 - cue for mechanics and technique.    Time Entry(in minutes):  Manual Therapy Time Entry: 15  Neuromuscular Re-Education Time Entry: 32  Therapeutic Activity Time Entry: 8    Assessment & Plan   Assessment: Patient presents to clinic with mild increase of soreness and symptoms to bilateral knees due to increased amounts of walking yesterday. Overall able to complete all activities with good tolerance, additional closed chain exercises for hip and knee stabilization. Tenderness along lateral thighs and greater trochanter with manual techniques but overall hip mobility felt pretty good. Will benefit from continued progression as able for functional strength and stability.  Evaluation/Treatment Tolerance: Patient tolerated treatment well    The patient will continue to benefit from skilled outpatient physical therapy in order to address the deficits listed in the problem list on the initial evaluation, provide patient and family education, and maximize the patients level of independence in the home and community environments.     The patient's spiritual, cultural, and educational needs were considered, and the patient is agreeable to the plan of care and goals.           Plan: Continue to progress as tolerated    Goals:   Active       Ambulation/movement       Patient will walk without AD, without knee brace without restrictions (Progressing)       Start:  06/05/25    Expected End:  08/01/25               Functional outcome       Patient stated goal: Return to employment according to previous level of function, [place] Son's wedding  (Progressing)       Start:  06/05/25    Expected End:  08/01/25            Patient will demonstrate independence in home program for support of progression (Progressing)        Start:  06/05/25    Expected End:  08/01/25               Maintaining body position       Patient will maintain sitting without pain, without restrictions (Progressing)       Start:  06/05/25    Expected End:  08/01/25            Patient will maintain standing without knee brace with no restrictions (Progressing)       Start:  06/05/25    Expected End:  08/01/25               Pain       Patient will report pain of 1-2/10 demonstrating a reduction of overall pain (Progressing)       Start:  06/05/25    Expected End:  08/01/25               Range of Motion       Patient will achieve left knee ROM of  0-140 degrees  (Progressing)       Start:  06/05/25    Expected End:  08/01/25               Strength       Patient will achieve bilateral hip strength of 4+/5 (Progressing)       Start:  06/05/25    Expected End:  08/01/25            Patient will achieve bilateral knee strength of 5/5 (Progressing)       Start:  06/05/25    Expected End:  08/01/25               Transferring       Patient will display normal transfers from seated surfaces without restrictions (Progressing)       Start:  06/05/25    Expected End:  08/01/25                Stephanie Yen PTA

## 2025-07-01 ENCOUNTER — CLINICAL SUPPORT (OUTPATIENT)
Dept: REHABILITATION | Facility: HOSPITAL | Age: 55
End: 2025-07-01
Payer: COMMERCIAL

## 2025-07-01 DIAGNOSIS — G89.29 CHRONIC PAIN OF LEFT KNEE: Primary | ICD-10-CM

## 2025-07-01 DIAGNOSIS — M25.662 KNEE STIFFNESS, LEFT: ICD-10-CM

## 2025-07-01 DIAGNOSIS — M62.81 WEAKNESS OF LEFT QUADRICEPS MUSCLE: ICD-10-CM

## 2025-07-01 DIAGNOSIS — M25.562 CHRONIC PAIN OF LEFT KNEE: Primary | ICD-10-CM

## 2025-07-01 DIAGNOSIS — R29.898 WEAKNESS OF LEFT HIP: ICD-10-CM

## 2025-07-01 DIAGNOSIS — R26.9 ALTERED GAIT: ICD-10-CM

## 2025-07-01 PROCEDURE — 97112 NEUROMUSCULAR REEDUCATION: CPT | Mod: PN,CQ

## 2025-07-01 PROCEDURE — 97140 MANUAL THERAPY 1/> REGIONS: CPT | Mod: PN,CQ

## 2025-07-01 PROCEDURE — 97530 THERAPEUTIC ACTIVITIES: CPT | Mod: PN,CQ

## 2025-07-01 NOTE — PROGRESS NOTES
Physical Therapy Visit    Patient Name: Alondra Putnam  MRN: [id number]  YOB: 1970  Encounter Date: 7/1/2025    Therapy Diagnosis:   Encounter Diagnoses   Name Primary?    Chronic pain of left knee Yes    Knee stiffness, left     Weakness of left quadriceps muscle     Weakness of left hip     Altered gait      Physician: James Edwards,*    Physician Orders: Eval and Treat  Medical Diagnosis: Acute medial meniscus tear, right, initial encounter  Sprain of other ligament of left knee, initial encounter  Surgical Diagnosis: Not applicable for this Episode   Surgical Date: Not applicable for this Episode  Days Since Last Surgery: Not applicable for this Episode    Visit # / Visits Authorized:  8 / 12  Insurance Authorization Period: 5/28/2025 to 5/28/2026  Date of Evaluation: 6/5/2025  Plan of Care Certification: 6/12/2025 to 8/1/2025      PT/PTA: PTA   Number of PTA visits since last PT visit:2  Time In: 1305   Time Out: 1400  Total Time (in minutes): 55   Total Billable Time (in minutes): 55    FOTO:  Intake Score: 29%  Survey Score 2: 29%  Survey Score 3:  %    Precautions:     GERD, vertigo.        Subjective   Tying activities without taking daily OTC meds to assess tolerance. Still using cane or walker or buggy for walking assist when out in public when pain is worse..  Pain reported as 4/10. L knee is about 2/10, R knee is 4/10.    Objective            Treatment:  Therapeutic Exercise  TE 1: Supine SLR 2x10  TE 2: Seated HSS in chair [id number] ea  Manual Therapy  MT 1: supine trigger point release, IASTM to distal Iliotibial Band and quad f/b (NP)manual stretching of quad and Iliotibial Band  MT 2: Medial patella glides/stretching of lateral tissues  Balance/Neuromuscular Re-Education  NMR 1: supine:  -QS + GS x10,  -GS + bridge 2x10,  - alternating TKE with large bolster with 1 lb x 2 min,  - alternating TKE with large bolster into SLR with 1 lb   - GS + bridge with lg bolster,  - alternaing  "heel taps with 1 lb  x 2' each  NMR 2: PPT + ball squeeze 20x, 3" hold, - PPT + clam with G loop 20x3", - marching with GTB at knees x20ea,  Therapeutic Activity  TA 1: Standing alternating march x 8 each, then rest due to onset of knee discomfort  TA 2: Standing heel raises 2x10 - cue for mechanics and technique.  TA 3: Verbal review of optional Standing Hip flex, hip abd, hip ext if supine exercises are too challenging    Time Entry(in minutes):  Manual Therapy Time Entry: 10  Neuromuscular Re-Education Time Entry: 27  Therapeutic Activity Time Entry: 10  Therapeutic Exercise Time Entry: 8    Assessment & Plan   Assessment: Patient with continued tightness along lateral patella and quad, tenderness noted with manual techniques. Improving quad activation and motor control with exercises, some pain does continue with weight bearing activities. Able to perform increased repetitions of several exercises. Some functional limitations continue due to weakness, motor control impairments, and endurance limitation.  Evaluation/Treatment Tolerance: Patient tolerated treatment well    The patient will continue to benefit from skilled outpatient physical therapy in order to address the deficits listed in the problem list on the initial evaluation, provide patient and family education, and maximize the patients level of independence in the home and community environments.     The patient's spiritual, cultural, and educational needs were considered, and the patient is agreeable to the plan of care and goals.           Plan: Continue to progress as tolerated toward physical therapy goals    Goals:   Active       Ambulation/movement       Patient will walk without AD, without knee brace without restrictions (Progressing)       Start:  06/05/25    Expected End:  08/01/25               Functional outcome       Patient stated goal: Return to employment according to previous level of function, [place] for Son's " wedding  (Progressing)       Start:  06/05/25    Expected End:  08/01/25            Patient will demonstrate independence in home program for support of progression (Progressing)       Start:  06/05/25    Expected End:  08/01/25               Maintaining body position       Patient will maintain sitting without pain, without restrictions (Progressing)       Start:  06/05/25    Expected End:  08/01/25            Patient will maintain standing without knee brace with no restrictions (Progressing)       Start:  06/05/25    Expected End:  08/01/25               Pain       Patient will report pain of 1-2/10 demonstrating a reduction of overall pain (Progressing)       Start:  06/05/25    Expected End:  08/01/25               Range of Motion       Patient will achieve left knee ROM of  0-140 degrees  (Progressing)       Start:  06/05/25    Expected End:  08/01/25               Strength       Patient will achieve bilateral hip strength of 4+/5 (Progressing)       Start:  06/05/25    Expected End:  08/01/25            Patient will achieve bilateral knee strength of 5/5 (Progressing)       Start:  06/05/25    Expected End:  08/01/25               Transferring       Patient will display normal transfers from seated surfaces without restrictions (Progressing)       Start:  06/05/25    Expected End:  08/01/25                Stephanie Yen PTA

## 2025-07-03 ENCOUNTER — CLINICAL SUPPORT (OUTPATIENT)
Dept: REHABILITATION | Facility: HOSPITAL | Age: 55
End: 2025-07-03
Payer: COMMERCIAL

## 2025-07-03 DIAGNOSIS — M62.81 WEAKNESS OF LEFT QUADRICEPS MUSCLE: ICD-10-CM

## 2025-07-03 DIAGNOSIS — M25.562 CHRONIC PAIN OF LEFT KNEE: Primary | ICD-10-CM

## 2025-07-03 DIAGNOSIS — S63.602A SPRAIN OF LEFT THUMB, UNSPECIFIED SITE OF DIGIT, INITIAL ENCOUNTER: ICD-10-CM

## 2025-07-03 DIAGNOSIS — R29.898 WEAKNESS OF LEFT HIP: ICD-10-CM

## 2025-07-03 DIAGNOSIS — M25.662 KNEE STIFFNESS, LEFT: ICD-10-CM

## 2025-07-03 DIAGNOSIS — G89.29 CHRONIC PAIN OF LEFT KNEE: Primary | ICD-10-CM

## 2025-07-03 DIAGNOSIS — R26.9 ALTERED GAIT: ICD-10-CM

## 2025-07-03 DIAGNOSIS — S63.601A SPRAIN OF RIGHT THUMB, UNSPECIFIED SITE OF DIGIT, INITIAL ENCOUNTER: Primary | ICD-10-CM

## 2025-07-03 PROCEDURE — 97110 THERAPEUTIC EXERCISES: CPT | Mod: PN,CQ

## 2025-07-03 PROCEDURE — 97140 MANUAL THERAPY 1/> REGIONS: CPT | Mod: PN,CQ

## 2025-07-03 PROCEDURE — 97112 NEUROMUSCULAR REEDUCATION: CPT | Mod: PN,CQ

## 2025-07-03 NOTE — PROGRESS NOTES
Physical Therapy Visit    Patient Name: Alondra Putnam  MRN: [id number]  YOB: 1970  Encounter Date: 7/3/2025    Therapy Diagnosis:   Encounter Diagnoses   Name Primary?    Chronic pain of left knee Yes    Knee stiffness, left     Weakness of left quadriceps muscle     Weakness of left hip     Altered gait      Physician: James Edwards,*    Physician Orders: Eval and Treat  Medical Diagnosis: Acute medial meniscus tear, right, initial encounter  Sprain of other ligament of left knee, initial encounter  Surgical Diagnosis: Not applicable for this Episode   Surgical Date: Not applicable for this Episode  Days Since Last Surgery: Not applicable for this Episode    Visit # / Visits Authorized:  9 / 12  Insurance Authorization Period: 5/28/2025 to 5/28/2026  Date of Evaluation: 6/5/2025  Plan of Care Certification: 6/12/2025 to 8/1/2025      PT/PTA: PTA   Number of PTA visits since last PT visit:3  Time In: 1505   Time Out: 1600  Total Time (in minutes): 55   Total Billable Time (in minutes): 55    FOTO:  Intake Score: 29%  Survey Score 2: 29%  Survey Score 3:  %    Precautions:     GERD, vertigo.        Subjective   Increased pain today from ADL's and not sleeping great. If not taking meds before bed pain seems to be worse..  Pain reported as 6/10. L knee is about 2/10, R knee is 4/10 after cold pack    Objective            Treatment:  Therapeutic Exercise  TE 1: Supine SLR 2x10  TE 2: Seated HSS in chair [id number] ea  TE 3: Standing gastroc sretch against counter 2x30s each  Manual Therapy  MT 1: supine trigger point release, IASTM to distal Iliotibial Band and quad f/b (NP)manual stretching of quad and Iliotibial Band  MT 2: Medial patella glides/stretching of lateral tissues  Balance/Neuromuscular Re-Education  NMR 1: supine:  -QS + GS x10,  -GS + bridge 2x10,  - alternating TKE with large bolster with 1 lb x 2 min,  - alternating TKE with large bolster into SLR with 1 lb   - GS + bridge with lg  "bolster,  - alternaing heel taps with 1 lb  x 2' each  NMR 2: PPT + ball squeeze 20x, 3" hold, - PPT + clam with G loop 20x3", - marching with GTB at knees x20ea,  Therapeutic Activity  TA 1: Standing alternating march x 8 each, HELD due to pain in B knees  TA 2: Standing heel raises 2x10 - cue for mechanics and technique.  TA 3: Verbal review of optional Standing Hip flex, hip abd, hip ext if supine exercises are too challenging    Time Entry(in minutes):  Manual Therapy Time Entry: 12  Neuromuscular Re-Education Time Entry: 25  Therapeutic Activity Time Entry: 8  Therapeutic Exercise Time Entry: 10    Assessment & Plan   Assessment: Received cold pack while waiting in lobby for therapy to start, reduced pain mildly to L knee though did not eliminate. Continued tightness and pain along medial aspect of bilateral knees, L > R, and into medial hamstring. Good quad activation and hip strength with mat exercises, some reduced tolerance to standing activities due to pain.   Evaluation/Treatment Tolerance: Patient tolerated treatment well    The patient will continue to benefit from skilled outpatient physical therapy in order to address the deficits listed in the problem list on the initial evaluation, provide patient and family education, and maximize the patients level of independence in the home and community environments.     The patient's spiritual, cultural, and educational needs were considered, and the patient is agreeable to the plan of care and goals.           Plan: Continue to progress as tolerated toward physical therapy goals    Goals:   Active       Ambulation/movement       Patient will walk without AD, without knee brace without restrictions (Progressing)       Start:  06/05/25    Expected End:  08/01/25               Functional outcome       Patient stated goal: Return to employment according to previous level of function, [place] for Son's wedding  (Progressing)       Start:  " 06/05/25    Expected End:  08/01/25            Patient will demonstrate independence in home program for support of progression (Progressing)       Start:  06/05/25    Expected End:  08/01/25               Maintaining body position       Patient will maintain sitting without pain, without restrictions (Progressing)       Start:  06/05/25    Expected End:  08/01/25            Patient will maintain standing without knee brace with no restrictions (Progressing)       Start:  06/05/25    Expected End:  08/01/25               Pain       Patient will report pain of 1-2/10 demonstrating a reduction of overall pain (Progressing)       Start:  06/05/25    Expected End:  08/01/25               Range of Motion       Patient will achieve left knee ROM of  0-140 degrees  (Progressing)       Start:  06/05/25    Expected End:  08/01/25               Strength       Patient will achieve bilateral hip strength of 4+/5 (Progressing)       Start:  06/05/25    Expected End:  08/01/25            Patient will achieve bilateral knee strength of 5/5 (Progressing)       Start:  06/05/25    Expected End:  08/01/25               Transferring       Patient will display normal transfers from seated surfaces without restrictions (Progressing)       Start:  06/05/25    Expected End:  08/01/25                Stephanie Yen PTA

## 2025-07-08 ENCOUNTER — CLINICAL SUPPORT (OUTPATIENT)
Dept: REHABILITATION | Facility: HOSPITAL | Age: 55
End: 2025-07-08
Payer: COMMERCIAL

## 2025-07-08 DIAGNOSIS — M62.81 WEAKNESS OF LEFT QUADRICEPS MUSCLE: ICD-10-CM

## 2025-07-08 DIAGNOSIS — M25.562 CHRONIC PAIN OF LEFT KNEE: Primary | ICD-10-CM

## 2025-07-08 DIAGNOSIS — R26.9 ALTERED GAIT: ICD-10-CM

## 2025-07-08 DIAGNOSIS — R29.898 WEAKNESS OF LEFT HIP: ICD-10-CM

## 2025-07-08 DIAGNOSIS — G89.29 CHRONIC PAIN OF LEFT KNEE: Primary | ICD-10-CM

## 2025-07-08 DIAGNOSIS — M25.662 KNEE STIFFNESS, LEFT: ICD-10-CM

## 2025-07-08 PROCEDURE — 97112 NEUROMUSCULAR REEDUCATION: CPT | Mod: PN

## 2025-07-08 PROCEDURE — 97530 THERAPEUTIC ACTIVITIES: CPT | Mod: PN

## 2025-07-08 PROCEDURE — 97140 MANUAL THERAPY 1/> REGIONS: CPT | Mod: PN

## 2025-07-08 NOTE — PROGRESS NOTES
Outpatient Rehab    Physical Therapy Progress Note    Patient Name: Alondra Putnam  MRN: [id number]  YOB: 1970  Encounter Date: 7/8/2025    Therapy Diagnosis:   Encounter Diagnoses   Name Primary?    Chronic pain of left knee Yes    Knee stiffness, left     Weakness of left quadriceps muscle     Weakness of left hip     Altered gait      Physician: James Edwards,*    Physician Orders: Eval and Treat  Medical Diagnosis: Acute medial meniscus tear, right, initial encounter  Sprain of other ligament of left knee, initial encounter  Surgical Diagnosis: Not applicable for this Episode   Surgical Date: Not applicable for this Episode  Days Since Last Surgery: Not applicable for this Episode    Visit # / Visits Authorized:  10 / 12  Insurance Authorization Period: 5/28/2025 to 5/28/2026  Date of Evaluation: 6/5/2025  Plan of Care Certification: 6/12/2025 to 8/1/2025      PT/PTA: PT   Number of PTA visits since last PT visit:3  Time In: 1300   Time Out: 1400  Total Time (in minutes): 60   Total Billable Time (in minutes): 55    FOTO:  Intake Score: 29%  Survey Score 2: 29%  Survey Score 3: 31%    Precautions:     GERD, vertigo.        Subjective   c/o in R knee that is a lot of soreness, L knee still has sharp pain medially.  Pain reported as 5/10. both knees are about 5/10    Objective   Posture                 Standing with more even distribution of weight in standing, no knee brace in clinic today to allow for exercises (still uses at home).    Knee Palpation      Increased tone and tenderness to palpation noted Left>Right distal Iliotibial Band, distal lateral quad.    (Much improved)        Increased tone and tenderness to palpation noted Left>Right distal Iliotibial Band, distal lateral quad.   (Much improved)         Knee Range of Motion   Right Knee   Active (deg) Passive (deg) Pain   Flexion 127 133 Yes (8/10 at EROM flexion)   Extension 0 2         Left Knee   Active (deg) Passive (deg) Pain  "  Flexion 123 132 Yes (8/10 pain at EROM flexion)   Extension 0 2                        Hip Strength - Planes of Motion   Right Strength Right Pain Left Strength Left  Pain   Flexion (L2) 5   5     Extension 4+   4     ABduction 4   3     ADduction           Internal Rotation           External Rotation 5   5         Knee Strength   Right Strength Right Pain Left Strength Left  Pain   Flexion (S2) 5   5     Prone Flexion           Extension (L3) 5   5                   Gait Analysis  Gait Analysis Details  Walking without AD with mild Left antalgic gait into Left stance phase, slight knee extension lag more obvious Left vs Right Lower Extremity.         Treatment:  Manual Therapy  MT 1: supine trigger point release, IASTM to distal Iliotibial Band and quad f/b (NP)manual stretching of quad and Iliotibial Band  Balance/Neuromuscular Re-Education  NMR 1: supine:  -QS + GS x10,  -GS + bridge 2x10,  - alternating TKE with large bolster with 2 lb x 2 min,  - alternating TKE with large bolster into SLR with 2 lb   - GS + bridge with lg bolster,  - alternaing heel taps with 2 lb  x 2' each,  -SLR 2 x 10 each  NMR 2: PPT + ball squeeze 20x, 3" hold, - PPT + clam with G loop 20x3", - marching with GTB at knees x20ea,  Therapeutic Activity  TA 1: Standing alternating march x 20  TA 2: Standing heel raises 2x10 - cue for mechanics and technique.    Time Entry(in minutes):  Manual Therapy Time Entry: 15  Neuromuscular Re-Education Time Entry: 30  Therapeutic Activity Time Entry: 10    Assessment & Plan   Assessment: Pt making some small gains in ROM and strength, some improvement in gait as well despite still reporting higher pain levels.  Evaluation/Treatment Tolerance: Patient tolerated treatment well    The patient will continue to benefit from skilled outpatient physical therapy in order to address the deficits listed in the problem list on the initial evaluation, provide patient and family education, and maximize the " patients level of independence in the home and community environments.     The patient's spiritual, cultural, and educational needs were considered, and the patient is agreeable to the plan of care and goals.           Plan: Continue to progress as tolerated toward physical therapy goals    Goals:   Active       Ambulation/movement       Patient will walk without AD, without knee brace without restrictions (Progressing)       Start:  06/05/25    Expected End:  08/01/25               Functional outcome       Patient stated goal: Return to employment according to previous level of function, [place] for Son's wedding  (Progressing)       Start:  06/05/25    Expected End:  08/01/25            Patient will demonstrate independence in home program for support of progression (Progressing)       Start:  06/05/25    Expected End:  08/01/25               Maintaining body position       Patient will maintain sitting without pain, without restrictions (Progressing)       Start:  06/05/25    Expected End:  08/01/25            Patient will maintain standing without knee brace with no restrictions (Progressing)       Start:  06/05/25    Expected End:  08/01/25               Pain       Patient will report pain of 1-2/10 demonstrating a reduction of overall pain (Progressing)       Start:  06/05/25    Expected End:  08/01/25               Range of Motion       Patient will achieve left knee ROM of  0-140 degrees  (Progressing)       Start:  06/05/25    Expected End:  08/01/25               Strength       Patient will achieve bilateral hip strength of 4+/5 (Progressing)       Start:  06/05/25    Expected End:  08/01/25            Patient will achieve bilateral knee strength of 5/5 (Progressing)       Start:  06/05/25    Expected End:  08/01/25               Transferring       Patient will display normal transfers from seated surfaces without restrictions (Progressing)       Start:  06/05/25    Expected End:   08/01/25                Kian Castro, PT

## 2025-07-10 ENCOUNTER — CLINICAL SUPPORT (OUTPATIENT)
Dept: REHABILITATION | Facility: HOSPITAL | Age: 55
End: 2025-07-10
Payer: COMMERCIAL

## 2025-07-10 DIAGNOSIS — R29.898 WEAKNESS OF LEFT HIP: ICD-10-CM

## 2025-07-10 DIAGNOSIS — M25.562 CHRONIC PAIN OF LEFT KNEE: Primary | ICD-10-CM

## 2025-07-10 DIAGNOSIS — R26.9 ALTERED GAIT: ICD-10-CM

## 2025-07-10 DIAGNOSIS — M25.662 KNEE STIFFNESS, LEFT: ICD-10-CM

## 2025-07-10 DIAGNOSIS — M62.81 WEAKNESS OF LEFT QUADRICEPS MUSCLE: ICD-10-CM

## 2025-07-10 DIAGNOSIS — G89.29 CHRONIC PAIN OF LEFT KNEE: Primary | ICD-10-CM

## 2025-07-10 PROCEDURE — 97112 NEUROMUSCULAR REEDUCATION: CPT | Mod: PN

## 2025-07-10 PROCEDURE — 97530 THERAPEUTIC ACTIVITIES: CPT | Mod: PN

## 2025-07-10 PROCEDURE — 97140 MANUAL THERAPY 1/> REGIONS: CPT | Mod: PN

## 2025-07-10 NOTE — PROGRESS NOTES
"  Outpatient Rehab    Physical Therapy Visit    Patient Name: Alondra Putnam  MRN: [id number]  YOB: 1970  Encounter Date: 7/10/2025    Therapy Diagnosis:   Encounter Diagnoses   Name Primary?    Chronic pain of left knee Yes    Knee stiffness, left     Weakness of left quadriceps muscle     Weakness of left hip     Altered gait      Physician: James Edwards,*    Physician Orders: Eval and Treat  Medical Diagnosis: Acute medial meniscus tear, right, initial encounter  Sprain of other ligament of left knee, initial encounter  Surgical Diagnosis: Not applicable for this Episode   Surgical Date: Not applicable for this Episode  Days Since Last Surgery: Not applicable for this Episode    Visit # / Visits Authorized:  11 / 12  Insurance Authorization Period: 5/28/2025 to 5/28/2026  Date of Evaluation: 6/5/2025  Plan of Care Certification: 6/12/2025 to 8/1/2025   Progress Note due:  8/8/2025 (Last Progress Note 7/8/2025)     PT/PTA: PT   Number of PTA visits since last PT visit:0  Time In: 1300   Time Out: 1400  Total Time (in minutes): 60   Total Billable Time (in minutes): 55    FOTO:  Intake Score: 29%  Survey Score 2: 29%  Survey Score 3: 31%    Precautions:     GERD, vertigo.        Subjective   both knees are hurting today..    both knees are about 5/10 but R knee is midly worse than L today    Objective            Treatment:  Therapeutic Exercise  TE 1: NP  Manual Therapy  MT 1: supine trigger point release, IASTM to distal Iliotibial Band and quad f/b (NP)manual stretching of quad and Iliotibial Band  MT 2: Medial patella glides/stretching of lateral tissues  Balance/Neuromuscular Re-Education  NMR 1: supine:  -QS + GS x10,  -GS + bridge 2x10,  - alternating TKE with large bolster with 2 lb x 2 min,  - alternating TKE with large bolster into SLR with 2 lb   - GS + bridge with lg bolster,  - alternaing heel taps with 2 lb  x 2' each,  -SLR 2 x 10 each with 2 lbs  NMR 2: PPT + ball squeeze 20x, 3" " "hold, - PPT + clam with G loop 20x3", - marching with GTB at knees x20ea,  Therapeutic Activity  TA 1: Standing alternating march x 20  TA 2: Standing heel raises 2x10 - cue for mechanics and technique.    Time Entry(in minutes):  Manual Therapy Time Entry: 15  Neuromuscular Re-Education Time Entry: 30  Therapeutic Activity Time Entry: 10    Assessment & Plan   Assessment: Pt making some small gains in ROM and strength, some improvement in gait as well despite still reporting higher pain levels.  Evaluation/Treatment Tolerance: Patient tolerated treatment well    The patient will continue to benefit from skilled outpatient physical therapy in order to address the deficits listed in the problem list on the initial evaluation, provide patient and family education, and maximize the patients level of independence in the home and community environments.     The patient's spiritual, cultural, and educational needs were considered, and the patient is agreeable to the plan of care and goals.           Plan: Continue to progress as tolerated toward physical therapy goals    Goals:   Active       Ambulation/movement       Patient will walk without AD, without knee brace without restrictions (Progressing)       Start:  06/05/25    Expected End:  08/01/25               Functional outcome       Patient stated goal: Return to employment according to previous level of function, [place] Son's wedding  (Progressing)       Start:  06/05/25    Expected End:  08/01/25            Patient will demonstrate independence in home program for support of progression (Progressing)       Start:  06/05/25    Expected End:  08/01/25               Maintaining body position       Patient will maintain sitting without pain, without restrictions (Progressing)       Start:  06/05/25    Expected End:  08/01/25            Patient will maintain standing without knee brace with no restrictions (Progressing)       Start:  06/05/25    " Expected End:  08/01/25               Pain       Patient will report pain of 1-2/10 demonstrating a reduction of overall pain (Progressing)       Start:  06/05/25    Expected End:  08/01/25               Range of Motion       Patient will achieve left knee ROM of  0-140 degrees  (Progressing)       Start:  06/05/25    Expected End:  08/01/25               Strength       Patient will achieve bilateral hip strength of 4+/5 (Progressing)       Start:  06/05/25    Expected End:  08/01/25            Patient will achieve bilateral knee strength of 5/5 (Progressing)       Start:  06/05/25    Expected End:  08/01/25               Transferring       Patient will display normal transfers from seated surfaces without restrictions (Progressing)       Start:  06/05/25    Expected End:  08/01/25                Kian Castro, PT

## 2025-07-14 RX ORDER — MELOXICAM 15 MG/1
15 TABLET ORAL
Qty: 30 TABLET | Refills: 1 | Status: SHIPPED | OUTPATIENT
Start: 2025-07-14

## 2025-07-15 ENCOUNTER — CLINICAL SUPPORT (OUTPATIENT)
Dept: REHABILITATION | Facility: HOSPITAL | Age: 55
End: 2025-07-15
Payer: COMMERCIAL

## 2025-07-15 DIAGNOSIS — M25.562 CHRONIC PAIN OF LEFT KNEE: Primary | ICD-10-CM

## 2025-07-15 DIAGNOSIS — M25.662 KNEE STIFFNESS, LEFT: ICD-10-CM

## 2025-07-15 DIAGNOSIS — R26.9 ALTERED GAIT: ICD-10-CM

## 2025-07-15 DIAGNOSIS — G89.29 CHRONIC PAIN OF LEFT KNEE: Primary | ICD-10-CM

## 2025-07-15 DIAGNOSIS — R29.898 WEAKNESS OF LEFT HIP: ICD-10-CM

## 2025-07-15 DIAGNOSIS — M62.81 WEAKNESS OF LEFT QUADRICEPS MUSCLE: ICD-10-CM

## 2025-07-15 PROCEDURE — 97140 MANUAL THERAPY 1/> REGIONS: CPT | Mod: PN

## 2025-07-15 PROCEDURE — 97112 NEUROMUSCULAR REEDUCATION: CPT | Mod: PN

## 2025-07-15 PROCEDURE — 97110 THERAPEUTIC EXERCISES: CPT | Mod: PN

## 2025-07-15 NOTE — PROGRESS NOTES
Outpatient Rehab    Physical Therapy Visit    Patient Name: Alondra Putnam  MRN: [id number]  YOB: 1970  Encounter Date: 7/15/2025    Therapy Diagnosis:   Encounter Diagnoses   Name Primary?    Chronic pain of left knee Yes    Knee stiffness, left     Weakness of left quadriceps muscle     Weakness of left hip     Altered gait      Physician: James Edwards,*    Physician Orders: Eval and Treat  Medical Diagnosis: Acute medial meniscus tear, right, initial encounter  Sprain of other ligament of left knee, initial encounter  Surgical Diagnosis: Not applicable for this Episode   Surgical Date: Not applicable for this Episode  Days Since Last Surgery: Not applicable for this Episode    Visit # / Visits Authorized:  12 / 12  Insurance Authorization Period: 5/28/2025 to 5/28/2026  Date of Evaluation: 6/5/2025  Plan of Care Certification: 6/12/2025 to 8/1/2025   Progress Note due:  8/8/2025 (Last Progress Note 7/8/2025)     PT/PTA: PT   Number of PTA visits since last PT visit:0  Time In: 1000   Time Out: 1100  Total Time (in minutes): 60   Total Billable Time (in minutes): 55    FOTO:  Intake Score: 29%  Survey Score 2: 29%  Survey Score 3: 31%    Precautions:  Additional Precautions and Protocol Details: GERD, vertigo.        Subjective   both knees are hurting today.  Has  f/u next week to see if she will get to move forward with knee surgery..  Pain reported as 5/10. both knees are about 5/10 but R knee is midly worse than L today    Objective            Treatment:  Therapeutic Exercise  TE 2: Seated HSS in chair [id number] ea  TE 3: Standing gastroc sretch against counter 2x30s each  Manual Therapy  MT 1: supine trigger point release, IASTM to distal Iliotibial Band and quad f/b (NP)manual stretching of quad and Iliotibial Band  MT 2: Medial patella glides/stretching of lateral tissues  Balance/Neuromuscular Re-Education  NMR 1: supine:  -QS + GS x10,  -GS + bridge 2x10,  - alternating TKE with  "large bolster with 2 lb x 2 min,  - alternating TKE with large bolster into SLR with 2 lb   - GS + bridge with lg bolster,  - alternaing heel taps with 2 lb  x 2' each,  -SLR 2 x 10 each with 2 lbs  NMR 2: PPT + ball squeeze 20x, 3" hold, - PPT + clam with G loop 20x3", - marching with GTB at knees x20ea,  NMR 3: Orange pball:  DKTC, alternating LAQ from ball x 2' each    Time Entry(in minutes):  Manual Therapy Time Entry: 15  Neuromuscular Re-Education Time Entry: 30  Therapeutic Exercise Time Entry: 10    Assessment & Plan   Assessment: Pt going to follow up with  after today's last visit.  She has made some progress with her tolerance, ROM, muscle tone but functionally has not improved significantly, FOTO score with no significant improvement either.  She will follow up with Dr to discuss Plan of Care and treatment options moving forward.  Evaluation/Treatment Tolerance: Patient limited by pain    The patient will continue to benefit from skilled outpatient physical therapy in order to address the deficits listed in the problem list on the initial evaluation, provide patient and family education, and maximize the patients level of independence in the home and community environments.     The patient's spiritual, cultural, and educational needs were considered, and the patient is agreeable to the plan of care and goals.           Plan: Pt to f/u with  to assess her current status and discuss POC and treatment options.    Goals:   Active       Ambulation/movement       Patient will walk without AD, without knee brace without restrictions (Progressing)       Start:  06/05/25    Expected End:  08/01/25               Functional outcome       Patient stated goal: Return to employment according to previous level of function, [place] for Son's wedding  (Progressing)       Start:  06/05/25    Expected End:  08/01/25            Patient will demonstrate independence in home program for support of " progression (Progressing)       Start:  06/05/25    Expected End:  08/01/25               Maintaining body position       Patient will maintain sitting without pain, without restrictions (Progressing)       Start:  06/05/25    Expected End:  08/01/25            Patient will maintain standing without knee brace with no restrictions (Progressing)       Start:  06/05/25    Expected End:  08/01/25               Pain       Patient will report pain of 1-2/10 demonstrating a reduction of overall pain (Progressing)       Start:  06/05/25    Expected End:  08/01/25               Range of Motion       Patient will achieve left knee ROM of  0-140 degrees  (Progressing)       Start:  06/05/25    Expected End:  08/01/25               Strength       Patient will achieve bilateral hip strength of 4+/5 (Progressing)       Start:  06/05/25    Expected End:  08/01/25            Patient will achieve bilateral knee strength of 5/5 (Progressing)       Start:  06/05/25    Expected End:  08/01/25               Transferring       Patient will display normal transfers from seated surfaces without restrictions (Progressing)       Start:  06/05/25    Expected End:  08/01/25                Kian Castro PT

## 2025-07-17 ENCOUNTER — HOSPITAL ENCOUNTER (OUTPATIENT)
Dept: RADIOLOGY | Facility: HOSPITAL | Age: 55
Discharge: HOME | End: 2025-07-17
Attending: CHIROPRACTOR
Payer: COMMERCIAL

## 2025-07-17 DIAGNOSIS — S63.601A SPRAIN OF RIGHT THUMB, UNSPECIFIED SITE OF DIGIT, INITIAL ENCOUNTER: ICD-10-CM

## 2025-07-17 DIAGNOSIS — S63.602A SPRAIN OF LEFT THUMB, UNSPECIFIED SITE OF DIGIT, INITIAL ENCOUNTER: ICD-10-CM

## 2025-07-17 PROCEDURE — 73218 MRI UPPER EXTREMITY W/O DYE: CPT | Mod: TC,PO,RT

## 2025-07-17 PROCEDURE — 73218 MRI UPPER EXTREMITY W/O DYE: CPT | Mod: TC,PO,LT

## 2025-07-23 ENCOUNTER — OFFICE VISIT (OUTPATIENT)
Dept: ORTHOPEDICS | Facility: CLINIC | Age: 55
End: 2025-07-23
Payer: COMMERCIAL

## 2025-07-23 ENCOUNTER — LAB VISIT (OUTPATIENT)
Dept: LAB | Facility: HOSPITAL | Age: 55
End: 2025-07-23
Attending: ORTHOPAEDIC SURGERY
Payer: COMMERCIAL

## 2025-07-23 DIAGNOSIS — S83.242A TEAR OF MEDIAL MENISCUS OF LEFT KNEE: ICD-10-CM

## 2025-07-23 DIAGNOSIS — S83.242A ACUTE MEDIAL MENISCUS TEAR OF LEFT KNEE, INITIAL ENCOUNTER: Primary | ICD-10-CM

## 2025-07-23 DIAGNOSIS — Z01.818 PRE-OP TESTING: Primary | ICD-10-CM

## 2025-07-23 DIAGNOSIS — S83.241D ACUTE MEDIAL MENISCAL TEAR, RIGHT, SUBSEQUENT ENCOUNTER: Primary | ICD-10-CM

## 2025-07-23 DIAGNOSIS — Z01.818 PRE-OP TESTING: ICD-10-CM

## 2025-07-23 DIAGNOSIS — S83.8X2A SPRAIN OF OTHER LIGAMENT OF LEFT KNEE, INITIAL ENCOUNTER: ICD-10-CM

## 2025-07-23 LAB
ABSOLUTE EOSINOPHIL (OHS): 0.33 K/UL
ABSOLUTE MONOCYTE (OHS): 0.67 K/UL (ref 0.3–1)
ABSOLUTE NEUTROPHIL COUNT (OHS): 4.25 K/UL (ref 1.8–7.7)
ANION GAP (OHS): 8 MMOL/L (ref 8–16)
BASOPHILS # BLD AUTO: 0.04 K/UL
BASOPHILS NFR BLD AUTO: 0.5 %
BUN SERPL-MCNC: 17 MG/DL (ref 6–20)
CALCIUM SERPL-MCNC: 9 MG/DL (ref 8.7–10.5)
CHLORIDE SERPL-SCNC: 108 MMOL/L (ref 95–110)
CO2 SERPL-SCNC: 25 MMOL/L (ref 23–29)
CREAT SERPL-MCNC: 0.8 MG/DL (ref 0.5–1.4)
ERYTHROCYTE [DISTWIDTH] IN BLOOD BY AUTOMATED COUNT: 13.6 % (ref 11.5–14.5)
GFR SERPLBLD CREATININE-BSD FMLA CKD-EPI: >60 ML/MIN/1.73/M2
GLUCOSE SERPL-MCNC: 88 MG/DL (ref 70–110)
HCT VFR BLD AUTO: 39.9 % (ref 37–48.5)
HGB BLD-MCNC: 12.3 GM/DL (ref 12–16)
IMM GRANULOCYTES # BLD AUTO: 0.02 K/UL (ref 0–0.04)
IMM GRANULOCYTES NFR BLD AUTO: 0.3 % (ref 0–0.5)
LYMPHOCYTES # BLD AUTO: 2.16 K/UL (ref 1–4.8)
MCH RBC QN AUTO: 27 PG (ref 27–31)
MCHC RBC AUTO-ENTMCNC: 30.8 G/DL (ref 32–36)
MCV RBC AUTO: 88 FL (ref 82–98)
NUCLEATED RBC (/100WBC) (OHS): 0 /100 WBC
PLATELET # BLD AUTO: 279 K/UL (ref 150–450)
PMV BLD AUTO: 11.2 FL (ref 9.2–12.9)
POTASSIUM SERPL-SCNC: 4 MMOL/L (ref 3.5–5.1)
RBC # BLD AUTO: 4.55 M/UL (ref 4–5.4)
RELATIVE EOSINOPHIL (OHS): 4.4 %
RELATIVE LYMPHOCYTE (OHS): 28.9 % (ref 18–48)
RELATIVE MONOCYTE (OHS): 9 % (ref 4–15)
RELATIVE NEUTROPHIL (OHS): 56.9 % (ref 38–73)
SODIUM SERPL-SCNC: 141 MMOL/L (ref 136–145)
WBC # BLD AUTO: 7.47 K/UL (ref 3.9–12.7)

## 2025-07-23 PROCEDURE — 99999 PR PBB SHADOW E&M-EST. PATIENT-LVL II: CPT | Mod: PBBFAC,,, | Performed by: ORTHOPAEDIC SURGERY

## 2025-07-23 PROCEDURE — 36415 COLL VENOUS BLD VENIPUNCTURE: CPT | Mod: PO

## 2025-07-23 PROCEDURE — 85025 COMPLETE CBC W/AUTO DIFF WBC: CPT

## 2025-07-23 PROCEDURE — 80048 BASIC METABOLIC PNL TOTAL CA: CPT

## 2025-07-23 RX ORDER — SODIUM CHLORIDE 9 MG/ML
INJECTION, SOLUTION INTRAVENOUS CONTINUOUS
OUTPATIENT
Start: 2025-07-23

## 2025-07-23 RX ORDER — MUPIROCIN 20 MG/G
OINTMENT TOPICAL
OUTPATIENT
Start: 2025-07-23

## 2025-07-23 NOTE — PROGRESS NOTES
Past Medical History:   Diagnosis Date    GERD (gastroesophageal reflux disease)     Hyperlipidemia     PONV (postoperative nausea and vomiting)     Sinus infection     Sleep apnea     uses cpap    Thyroid disease     Vertigo        Past Surgical History:   Procedure Laterality Date    ADENOIDECTOMY N/A 2021    Procedure: ADENOIDECTOMY;  Surgeon: Abilio Hodges MD;  Location: [facility];  Service: ENT;  Laterality: N/A;     SECTION      COLPOSCOPY N/A 2024    Procedure: COLPOSCOPY;  Surgeon: Anitha Mc MD;  Location: [facility];  Service: OB/GYN;  Laterality: N/A;    DILATION AND CURETTAGE OF UTERUS      HYSTEROSCOPY WITH DILATION AND CURETTAGE OF UTERUS N/A 2024    Procedure: HYSTEROSCOPY, WITH DILATION AND CURETTAGE OF UTERUS/ ultrasound;  Surgeon: Anitha Mc MD;  Location: [facility];  Service: OB/GYN;  Laterality: N/A;    LAPAROSCOPIC SALPINGO-OOPHORECTOMY Left 2022    Procedure: SALPINGO-OOPHORECTOMY, LAPAROSCOPIC;  Surgeon: Kevin Casillas MD;  Location: [facility];  Service: OB/GYN;  Laterality: Left;    NASAL SEPTOPLASTY Bilateral 2021    Procedure: SEPTOPLASTY, NOSE;  Surgeon: Abilio Hodges MD;  Location: [facility];  Service: ENT;  Laterality: Bilateral;    OOPHORECTOMY      TONSILLECTOMY, ADENOIDECTOMY      TUBAL LIGATION         Current Outpatient Medications   Medication Sig    ALPRAZolam (XANAX) 0.5 MG tablet Take 1 tablet (0.5 mg total) by mouth 3 (three) times daily as needed for Insomnia or Anxiety.    benzonatate (TESSALON) 100 MG capsule Take 100 mg by mouth 3 (three) times daily.    cetirizine (ZYRTEC) 10 MG tablet Take 10 mg by mouth daily as needed.    cyclobenzaprine (FLEXERIL) 10 MG tablet Take 10 mg by mouth.    levothyroxine (SYNTHROID) 112 MCG tablet Take 1 tablet (112 mcg total) by mouth before breakfast.    losartan (COZAAR) 25 MG tablet Take 1 tablet (25 mg total) by mouth once daily.    meclizine (ANTIVERT) 25 mg tablet Take 1 tablet (25 mg  total) by mouth 2 (two) times daily as needed.    meloxicam (MOBIC) 15 MG tablet TAKE 1 TABLET BY MOUTH EVERY DAY    montelukast (SINGULAIR) 10 mg tablet Take 1 tablet (10 mg total) by mouth every evening.    multivitamin (THERAGRAN) per tablet Take 1 tablet by mouth once daily.    omeprazole (PRILOSEC) 20 MG capsule Take 20 mg by mouth once daily.    pravastatin (PRAVACHOL) 20 MG tablet Take 1 tablet (20 mg total) by mouth every evening.    progesterone (PROMETRIUM) 100 MG capsule Take 100 mg by mouth every evening. at bedtime    spironolactone (ALDACTONE) 50 MG tablet Take 1 tablet (50 mg total) by mouth once daily.    tirzepatide, weight loss, (ZEPBOUND) 2.5 mg/0.5 mL PnIj Inject 2.5 mg into the skin every 7 days.     No current facility-administered medications for this visit.       Review of patient's allergies indicates:  No Known Allergies    Family History   Problem Relation Name Age of Onset    Aneurysm Mother      Diabetes Father      Hypertension Father      Gout Father      Hypertension Brother      Sudden death Brother      Breast cancer Neg Hx      Cancer Neg Hx      Colon cancer Neg Hx      Eclampsia Neg Hx      Miscarriages / Stillbirths Neg Hx      Ovarian cancer Neg Hx       labor Neg Hx      Stroke Neg Hx         Social History[1]    Chief Complaint: No chief complaint on file.      History of present illness:  54-year-old female seen for left knee pain.   This is a worker's compensation injury when she slipped on some water while at work back in February.  Patient is a travel nurse.  She has not been working since then. previous treatment with some chiropractic care.  Patient complains of left sharp stabbing pain when she puts weight on it.  Has been using a brace.  Pain keeps her up at night.  Uses Flexeril.  Was just given Mobic about a week ago which does seem to help. Denies locking or instability.  We tried some physical therapy.  MRI showed a medial meniscal tear as well as some  arthritis.    Prior treatment:  Brace and Mobic and physical therapy        Review of Systems:  Musculoskeletal:  See HPI      Physical Examination:    Vital Signs:  There were no vitals filed for this visit.    There is no height or weight on file to calculate BMI.    This a well-developed, well nourished patient in no acute distress.  They are alert and oriented and cooperative to examination.  Pt. walks without an antalgic gait.      Examination of the left knee shows no rashes or erythema. There are no masses ecchymosis or effusion. Patient has full range of motion from 0-130°. Patient is nontender to palpation over lateral joint line and moderately tender to palpation over the medial joint line. Patient has a - Lachman exam, - anterior drawer exam, and - posterior drawer exam.  Positive medial Apley exam. Knee is stable to varus and valgus stress. 5 out of 5 motor strength. Palpable distal pulses. Intact light touch sensation. Negative Patellofemoral crepitus    Heart is regular rate without obvious murmurs   Normal respiratory effort without audible wheezing  Abdomen is soft and nontender       X-rays:  X-rays of the left knee are available for review which show well-maintained joint space    MRI of the left knee is reviewed and interpreted:1. Horizontal oblique undersurface tear of the body and posterior horn of the medial meniscus.  2. Small knee joint effusion  3. High-grade cartilage loss within the patellar midpole involving the medial patellar facet, lateral patellar facet, and patellar eminence.  4. 17 mm probable ganglion cyst along the posteromedial aspect of the left knee         Assessment:  Left medial meniscal tear    Plan:  I reviewed the findings of the x-ray and the MRI with her today we.  We talked about treatment options.  Patient tried formal physical therapy without improvement.  Plan is for left knee arthroscopy with partial medial meniscectomy.  Risks, benefits, and alternatives to the  procedure were explained to the patient including but not limited to damage to nerves, arteries, blood vessels, bones, tendons, ligaments, stiffness, instability, infection, permanent limb dysfunction, DVT, PE, as well as general anesthetic complications including seizure, stroke, heart attack and even death. The patient understood these risks and wished to proceed and signed the informed consent.               All previous pertinent notes including ER visits, physical therapy visits, other orthopedic visits as well as other care for the same musculoskeletal problem were reviewed.  All pertinent lab values and previous imaging was reviewed pertinent to the current visit.    This note was created using [employer] voice recognition software that occasionally misinterpreted phrases or words.    Consult note is delivered via Epic messaging service.             [1]   Social History  Socioeconomic History    Marital status:     Number of children: 2   Occupational History    Occupation: RN   Tobacco Use    Smoking status: Never    Smokeless tobacco: Never   Substance and Sexual Activity    Alcohol use: Yes     Comment: socially    Drug use: Never    Sexual activity: Yes     Partners: Male     Birth control/protection: See Surgical Hx     Social Drivers of Health     Financial Resource Strain: Low Risk  (5/26/2025)    Overall Financial Resource Strain (CARDIA)     Difficulty of Paying Living Expenses: Not very hard   Food Insecurity: No Food Insecurity (5/26/2025)    Hunger Vital Sign     Worried About Running Out of Food in the Last Year: Never true     Ran Out of Food in the Last Year: Never true   Transportation Needs: No Transportation Needs (5/26/2025)    PRAPARE - Transportation     Lack of Transportation (Medical): No     Lack of Transportation (Non-Medical): No   Physical Activity: Unknown (5/26/2025)    Exercise Vital Sign     Days of Exercise per Week: Patient declined     Minutes of Exercise per Session: 0  min   Stress: Stress Concern Present (5/26/2025)    [facility]ational [facility] - Occupational Stress Questionnaire     Feeling of Stress : Rather much   Housing Stability: Low Risk  (5/26/2025)    Housing Stability Vital Sign     Unable to Pay for Housing in the Last Year: No     Number of Times Moved in the Last Year: 0     Homeless in the Last Year: No

## 2025-07-28 ENCOUNTER — TELEPHONE (OUTPATIENT)
Dept: URGENT CARE | Facility: CLINIC | Age: 55
End: 2025-07-28
Payer: COMMERCIAL

## 2025-07-28 NOTE — TELEPHONE ENCOUNTER
Patient called to get scheduled with Dr South Garcia in neurosurgery, she will have her  email the [facility] the approval and referral

## 2025-08-01 ENCOUNTER — PATIENT MESSAGE (OUTPATIENT)
Dept: ORTHOPEDICS | Facility: CLINIC | Age: 55
End: 2025-08-01
Payer: COMMERCIAL

## 2025-08-06 ENCOUNTER — ANESTHESIA EVENT (OUTPATIENT)
Dept: SURGERY | Facility: HOSPITAL | Age: 55
End: 2025-08-06
Payer: COMMERCIAL

## 2025-08-07 ENCOUNTER — OFFICE VISIT (OUTPATIENT)
Dept: NEUROSURGERY | Facility: CLINIC | Age: 55
End: 2025-08-07
Payer: COMMERCIAL

## 2025-08-07 VITALS
DIASTOLIC BLOOD PRESSURE: 94 MMHG | HEART RATE: 74 BPM | BODY MASS INDEX: 41.5 KG/M2 | WEIGHT: 265 LBS | SYSTOLIC BLOOD PRESSURE: 156 MMHG

## 2025-08-07 DIAGNOSIS — M51.362 DEGENERATION OF INTERVERTEBRAL DISC OF LUMBAR REGION WITH DISCOGENIC BACK PAIN AND LOWER EXTREMITY PAIN: ICD-10-CM

## 2025-08-07 DIAGNOSIS — M47.816 LUMBAR SPONDYLOSIS: ICD-10-CM

## 2025-08-07 DIAGNOSIS — M54.12 CERVICAL RADICULOPATHY: ICD-10-CM

## 2025-08-07 DIAGNOSIS — M54.2 NECK PAIN: ICD-10-CM

## 2025-08-07 DIAGNOSIS — M47.812 CERVICAL SPONDYLOSIS: ICD-10-CM

## 2025-08-07 DIAGNOSIS — M50.30 DDD (DEGENERATIVE DISC DISEASE), CERVICAL: ICD-10-CM

## 2025-08-07 DIAGNOSIS — M54.9 BACK PAIN, UNSPECIFIED BACK LOCATION, UNSPECIFIED BACK PAIN LATERALITY, UNSPECIFIED CHRONICITY: ICD-10-CM

## 2025-08-07 DIAGNOSIS — M54.16 LUMBAR RADICULOPATHY: Primary | ICD-10-CM

## 2025-08-07 NOTE — PROGRESS NOTES
HPI:    Mrs. Putnam is a 54 year old female presenting initially complaining of neck and low back pain 5/10 today. Ongoing for years and slowly worsening. Neck pain into her shoulder bilaterally with some numbness in her L hand. She does report a fall recently onto this hand however. She had an MRI of it that was negative, she reports. She also has low back pain with pain in her knees, although she has a torn meniscus in both knees also. Her pains are fairly constant, daily and she uses a cane for ambulation at this time. No PT/PM for her spine. No prior spine surgeries.     General: well developed, well nourished, no distress.   Neurologic: Alert and oriented. Thought content appropriate.  Cranial nerves: face symmetric, EOMI.   Motor Strength: Moves all extremities spontaneously with good tone. No abnormal movements seen.      Strength   Deltoids Triceps Biceps Wrist Extension Wrist Flexion Hand    Upper: R 5/5 5/5 5/5 5/5 5/5 5/5     L 5/5 5/5 5/5 5/5 5/5 5/5       Iliopsoas Quadriceps Knee  Flexion Tibialis  anterior Gastro- cnemius EHL   Lower: R 5/5 5/5 5/5 5/5 5/5 5/5     L 5/5 5/5 5/5 5/5 5/5 5/5        Radiculopathy: Bilat C5/6 and intermittent LE L4/5  Gait: antalgic - uses cane    Sensory: intact to light touch throughout  DTR's - 0 + and symmetric in UE  Valle: neg    IMAGING:  EXAMINATION:  MRI CERVICAL SPINE WITHOUT CONTRAST     CLINICAL HISTORY:  Spondylosis without myelopathy or radiculopathy, cervical region     TECHNIQUE:  Multiplanar, multisequence MR images of the cervical spine were performed without the administration of contrast.     COMPARISON:  None.     FINDINGS:  Alignment: Normal.     Vertebrae: Normal marrow signal. No fracture.     Discs: There is mild multilevel disc space narrowing with anterior spurring at C4-5, C5-6 and C6-7.  The remainder of the intervertebral disc spaces are maintained.     Cord: The cervical cord is normal in caliber and there are no intramedullary  lesions.  The cerebellar tonsils are in normal location.     Skull base and craniocervical junction: Normal.     Degenerative findings:     C2-C3: No significant abnormality     C3-C4: Shallow disc bulge with left uncinate process hypertrophy resulting in left foraminal narrowing.     C4-C5: Broad-based disc bulge and mild facet hypertrophy resulting in moderate bilateral foraminal narrowing     C5-C6: Broad-based disc bulge and facet hypertrophy resulting in bilateral foraminal narrowing     C6-C7: Broad-based disc bulge and facet hypertrophy resulting in mild foraminal narrowing     C7-T1: No significant abnormality     Paraspinal muscles & soft tissues: Unremarkable.     Impression:     Multilevel degenerative disc disease and facet hypertrophy resulting in moderate bilateral foraminal narrowing at C4-5 and moderate left foraminal narrowing at C3-4.  There is mild foraminal narrowing at C5-6, C6-7        Electronically signed by:Reta Carolina  Date:                                            05/29/2025  Time:                                           15:47    EXAMINATION:  MRI LUMBAR SPINE WITHOUT CONTRAST     CLINICAL HISTORY:  Spondylosis without myelopathy or radiculopathy, lumbar region     TECHNIQUE:  MRI SPINE (LUMBAR) without IV contrast     COMPARISON:  None     FINDINGS:  At T12-L1, mild loss of disc space height and annular bulge cause no narrowing.     At L1-L2, mild loss of disc space height and annular bulge cause no narrowing.     At L2-L3, minor leftward annular bulge without narrowing.     At L3-L4, annular bulge and midline annular fissure combined with mild facet joint osteoarthrosis to cause no central canal or neural foramen narrowing.     At L4-L5, mild annular bulge and midline annular fissure combined with mild facet joint osteoarthrosis to cause no central canal or neural foramen narrowing.     At L5-S1, moderate loss of disc space height is present with minor disc osteophyte complex  causing no central canal or neural foramen narrowing.     Degenerative discogenic bone marrow endplate signal alteration occurs opposing endplates of L5-S1 and L1-L2 discs, also partially visualized at T11-T12.  Vertebral bodies otherwise show heterogeneous bone marrow signal alteration suggesting mild diffuse red marrow recruitment.  Conus terminates normally at L1.  Paraspinal soft tissues are unremarkable.     Impression:     Multilevel lumbar degenerative changes, with moderate disc degeneration at L1-L2 and L5-S1.        Electronically signed by:Oleg Ellis  Date:                                            05/29/2025  Time:                                           13:54      ASSESSMENT AND PLAN:    Mrs. Putnam is a 54 year old female presenting initially complaining of cervical and lumbar spine pain into her extremities.  It is constant, daily and affects her daily life with activities.  She uses a cane for ambulation now.  Her neck pain radiates to her shoulders with some numbness in her left hand.  Her back pain radiates to her knees, although she has separate knee issues.  She has tried anti-inflammatories without lasting relief.  She has not done any physical therapy or pain management for her spine.  She has cervical and lumbar spine imaging that was independently reviewed today.  Cervical spine imaging shows a host of degenerative changes from C3-7 with degenerated discs, osteophytes, and facet arthropathy contributing to some foraminal narrowing at multiple levels.  There is nothing dangerous ongoing.  From a surgical standpoint, it would take a large neck surgery to fix this and I am not confident that I will significantly help her pain.  Her lumbar spine imaging shows similar issues with degenerated discs, facet arthropathy and spondylosis throughout from her thoracolumbar junction to her sacrum.  Again, any surgical intervention is unlikely to address all of her issues and should be a last resort.  I  would recommend maximizing conservative treatment physical therapy and pain management and she may follow up as needed at this point in time.  Images were reviewed with an case was discussed with Dr. Garcia who is in agreement with the plan.  Patient also voices understanding and agrees with the plan.    This note was partially dictated using voice recognition software, so please excuse any errors that were not corrected.    Reynaldo Vines PA-C  Neurosurgery

## 2025-08-08 ENCOUNTER — HOSPITAL ENCOUNTER (OUTPATIENT)
Facility: HOSPITAL | Age: 55
Discharge: HOME | End: 2025-08-08
Attending: ORTHOPAEDIC SURGERY | Admitting: ORTHOPAEDIC SURGERY
Payer: COMMERCIAL

## 2025-08-08 ENCOUNTER — ANESTHESIA (OUTPATIENT)
Dept: SURGERY | Facility: HOSPITAL | Age: 55
End: 2025-08-08
Payer: COMMERCIAL

## 2025-08-08 DIAGNOSIS — S83.242A TEAR OF MEDIAL MENISCUS OF LEFT KNEE: Primary | ICD-10-CM

## 2025-08-08 DIAGNOSIS — M54.16 LUMBAR RADICULOPATHY: Primary | ICD-10-CM

## 2025-08-08 DIAGNOSIS — S83.242A ACUTE MEDIAL MENISCUS TEAR OF LEFT KNEE, INITIAL ENCOUNTER: ICD-10-CM

## 2025-08-08 DIAGNOSIS — S83.8X2A SPRAIN OF OTHER LIGAMENT OF LEFT KNEE, INITIAL ENCOUNTER: ICD-10-CM

## 2025-08-08 PROCEDURE — 36000710: Mod: PO | Performed by: ORTHOPAEDIC SURGERY

## 2025-08-08 PROCEDURE — 63600175 PHARM REV CODE 636 W HCPCS: Mod: PO | Performed by: NURSE ANESTHETIST, CERTIFIED REGISTERED

## 2025-08-08 PROCEDURE — 63600175 PHARM REV CODE 636 W HCPCS: Mod: PO | Performed by: ORTHOPAEDIC SURGERY

## 2025-08-08 PROCEDURE — 71000033 HC RECOVERY, INTIAL HOUR: Mod: PO | Performed by: ORTHOPAEDIC SURGERY

## 2025-08-08 PROCEDURE — 25000003 PHARM REV CODE 250: Mod: PO | Performed by: ANESTHESIOLOGY

## 2025-08-08 PROCEDURE — 37000008 HC ANESTHESIA 1ST 15 MINUTES: Mod: PO | Performed by: ORTHOPAEDIC SURGERY

## 2025-08-08 PROCEDURE — 25000003 PHARM REV CODE 250: Mod: PO | Performed by: ORTHOPAEDIC SURGERY

## 2025-08-08 PROCEDURE — 63600175 PHARM REV CODE 636 W HCPCS: Mod: PO | Performed by: ANESTHESIOLOGY

## 2025-08-08 PROCEDURE — 36000711: Mod: PO | Performed by: ORTHOPAEDIC SURGERY

## 2025-08-08 PROCEDURE — 27201423 OPTIME MED/SURG SUP & DEVICES STERILE SUPPLY: Mod: PO | Performed by: ORTHOPAEDIC SURGERY

## 2025-08-08 PROCEDURE — 71000015 HC POSTOP RECOV 1ST HR: Mod: PO | Performed by: ORTHOPAEDIC SURGERY

## 2025-08-08 PROCEDURE — 29881 ARTHRS KNE SRG MNISECTMY M/L: CPT | Mod: LT,,, | Performed by: ORTHOPAEDIC SURGERY

## 2025-08-08 PROCEDURE — 37000009 HC ANESTHESIA EA ADD 15 MINS: Mod: PO | Performed by: ORTHOPAEDIC SURGERY

## 2025-08-08 RX ORDER — IBUPROFEN 800 MG/1
800 TABLET, FILM COATED ORAL EVERY 6 HOURS PRN
Qty: 30 TABLET | Refills: 0 | Status: SHIPPED | OUTPATIENT
Start: 2025-08-08

## 2025-08-08 RX ORDER — SODIUM CHLORIDE, SODIUM LACTATE, POTASSIUM CHLORIDE, CALCIUM CHLORIDE 600; 310; 30; 20 MG/100ML; MG/100ML; MG/100ML; MG/100ML
INJECTION, SOLUTION INTRAVENOUS CONTINUOUS
Status: DISCONTINUED | OUTPATIENT
Start: 2025-08-08 | End: 2025-08-08 | Stop reason: HOSPADM

## 2025-08-08 RX ORDER — LIDOCAINE HYDROCHLORIDE 20 MG/ML
INJECTION INTRAVENOUS
Status: DISCONTINUED | OUTPATIENT
Start: 2025-08-08 | End: 2025-08-08

## 2025-08-08 RX ORDER — HYDROCODONE BITARTRATE AND ACETAMINOPHEN 5; 325 MG/1; MG/1
1 TABLET ORAL EVERY 6 HOURS PRN
Qty: 10 TABLET | Refills: 0 | Status: SHIPPED | OUTPATIENT
Start: 2025-08-08

## 2025-08-08 RX ORDER — CYCLOBENZAPRINE HCL 5 MG
5 TABLET ORAL 3 TIMES DAILY PRN
Qty: 30 TABLET | Refills: 0 | Status: SHIPPED | OUTPATIENT
Start: 2025-08-08 | End: 2025-08-18

## 2025-08-08 RX ORDER — CEFAZOLIN SODIUM 1 G/3ML
2 INJECTION, POWDER, FOR SOLUTION INTRAMUSCULAR; INTRAVENOUS
Status: COMPLETED | OUTPATIENT
Start: 2025-08-08 | End: 2025-08-08

## 2025-08-08 RX ORDER — OXYCODONE HYDROCHLORIDE 5 MG/1
5 TABLET ORAL
Status: DISCONTINUED | OUTPATIENT
Start: 2025-08-08 | End: 2025-08-08 | Stop reason: HOSPADM

## 2025-08-08 RX ORDER — ACETAMINOPHEN 10 MG/ML
INJECTION, SOLUTION INTRAVENOUS
Status: DISCONTINUED | OUTPATIENT
Start: 2025-08-08 | End: 2025-08-08

## 2025-08-08 RX ORDER — ASPIRIN 81 MG/1
81 TABLET ORAL 2 TIMES DAILY
COMMUNITY
Start: 2025-08-08 | End: 2026-08-08

## 2025-08-08 RX ORDER — ACETAMINOPHEN 500 MG
1000 TABLET ORAL EVERY 8 HOURS PRN
Qty: 30 TABLET | Refills: 0 | Status: SHIPPED | OUTPATIENT
Start: 2025-08-08

## 2025-08-08 RX ORDER — MIDAZOLAM HYDROCHLORIDE 1 MG/ML
INJECTION INTRAMUSCULAR; INTRAVENOUS
Status: DISCONTINUED | OUTPATIENT
Start: 2025-08-08 | End: 2025-08-08

## 2025-08-08 RX ORDER — FENTANYL CITRATE 50 UG/ML
INJECTION, SOLUTION INTRAMUSCULAR; INTRAVENOUS
Status: DISCONTINUED | OUTPATIENT
Start: 2025-08-08 | End: 2025-08-08

## 2025-08-08 RX ORDER — HYDROCODONE BITARTRATE AND ACETAMINOPHEN 5; 325 MG/1; MG/1
1 TABLET ORAL EVERY 4 HOURS PRN
Status: DISCONTINUED | OUTPATIENT
Start: 2025-08-08 | End: 2025-08-08 | Stop reason: HOSPADM

## 2025-08-08 RX ORDER — HYDROMORPHONE HYDROCHLORIDE 2 MG/ML
0.2 INJECTION, SOLUTION INTRAMUSCULAR; INTRAVENOUS; SUBCUTANEOUS EVERY 5 MIN PRN
Status: DISCONTINUED | OUTPATIENT
Start: 2025-08-08 | End: 2025-08-08 | Stop reason: HOSPADM

## 2025-08-08 RX ORDER — MUPIROCIN 20 MG/G
OINTMENT TOPICAL
Status: DISCONTINUED | OUTPATIENT
Start: 2025-08-08 | End: 2025-08-08 | Stop reason: HOSPADM

## 2025-08-08 RX ORDER — LIDOCAINE HYDROCHLORIDE 10 MG/ML
1 INJECTION, SOLUTION EPIDURAL; INFILTRATION; INTRACAUDAL; PERINEURAL ONCE
Status: DISCONTINUED | OUTPATIENT
Start: 2025-08-08 | End: 2025-08-08 | Stop reason: HOSPADM

## 2025-08-08 RX ORDER — CYCLOBENZAPRINE HCL 10 MG
10 TABLET ORAL 3 TIMES DAILY PRN
Qty: 30 TABLET | Refills: 0 | Status: SHIPPED | OUTPATIENT
Start: 2025-08-08 | End: 2025-08-08 | Stop reason: SDUPTHER

## 2025-08-08 RX ORDER — ONDANSETRON 4 MG/1
4 TABLET, ORALLY DISINTEGRATING ORAL EVERY 8 HOURS PRN
Qty: 30 TABLET | Refills: 0 | Status: SHIPPED | OUTPATIENT
Start: 2025-08-08

## 2025-08-08 RX ORDER — ONDANSETRON HYDROCHLORIDE 2 MG/ML
4 INJECTION, SOLUTION INTRAVENOUS DAILY PRN
Status: DISCONTINUED | OUTPATIENT
Start: 2025-08-08 | End: 2025-08-08 | Stop reason: HOSPADM

## 2025-08-08 RX ORDER — ONDANSETRON HYDROCHLORIDE 2 MG/ML
INJECTION, SOLUTION INTRAVENOUS
Status: DISCONTINUED | OUTPATIENT
Start: 2025-08-08 | End: 2025-08-08

## 2025-08-08 RX ORDER — SODIUM CHLORIDE 9 MG/ML
INJECTION, SOLUTION INTRAVENOUS CONTINUOUS
Status: DISCONTINUED | OUTPATIENT
Start: 2025-08-08 | End: 2025-08-08 | Stop reason: HOSPADM

## 2025-08-08 RX ORDER — PROPOFOL 10 MG/ML
VIAL (ML) INTRAVENOUS
Status: DISCONTINUED | OUTPATIENT
Start: 2025-08-08 | End: 2025-08-08

## 2025-08-08 RX ORDER — EPINEPHRINE 1 MG/ML
INJECTION INTRAMUSCULAR; INTRAVENOUS; SUBCUTANEOUS
Status: DISCONTINUED | OUTPATIENT
Start: 2025-08-08 | End: 2025-08-08 | Stop reason: HOSPADM

## 2025-08-08 RX ORDER — FENTANYL CITRATE 50 UG/ML
25 INJECTION, SOLUTION INTRAMUSCULAR; INTRAVENOUS EVERY 5 MIN PRN
Status: COMPLETED | OUTPATIENT
Start: 2025-08-08 | End: 2025-08-08

## 2025-08-08 RX ORDER — GLUCAGON 1 MG
1 KIT INJECTION
Status: DISCONTINUED | OUTPATIENT
Start: 2025-08-08 | End: 2025-08-08 | Stop reason: HOSPADM

## 2025-08-08 RX ORDER — DIPHENHYDRAMINE HYDROCHLORIDE 50 MG/ML
12.5 INJECTION, SOLUTION INTRAMUSCULAR; INTRAVENOUS EVERY 6 HOURS PRN
Status: DISCONTINUED | OUTPATIENT
Start: 2025-08-08 | End: 2025-08-08 | Stop reason: HOSPADM

## 2025-08-08 RX ADMIN — OXYCODONE HYDROCHLORIDE 5 MG: 5 TABLET ORAL at 10:08

## 2025-08-08 RX ADMIN — FENTANYL CITRATE 50 MCG: 50 INJECTION, SOLUTION INTRAMUSCULAR; INTRAVENOUS at 09:08

## 2025-08-08 RX ADMIN — MUPIROCIN: 20 OINTMENT TOPICAL at 08:08

## 2025-08-08 RX ADMIN — SODIUM CHLORIDE, POTASSIUM CHLORIDE, SODIUM LACTATE AND CALCIUM CHLORIDE: 600; 310; 30; 20 INJECTION, SOLUTION INTRAVENOUS at 08:08

## 2025-08-08 RX ADMIN — PROPOFOL 150 MG: 10 INJECTION, EMULSION INTRAVENOUS at 09:08

## 2025-08-08 RX ADMIN — ACETAMINOPHEN 1000 MG: 10 INJECTION INTRAVENOUS at 09:08

## 2025-08-08 RX ADMIN — FENTANYL CITRATE 100 MCG: 50 INJECTION, SOLUTION INTRAMUSCULAR; INTRAVENOUS at 09:08

## 2025-08-08 RX ADMIN — LIDOCAINE HYDROCHLORIDE 50 MG: 20 INJECTION INTRAVENOUS at 09:08

## 2025-08-08 RX ADMIN — CEFAZOLIN 3 G: 330 INJECTION, POWDER, FOR SOLUTION INTRAMUSCULAR; INTRAVENOUS at 09:08

## 2025-08-08 RX ADMIN — FENTANYL CITRATE 25 MCG: 50 INJECTION INTRAMUSCULAR; INTRAVENOUS at 09:08

## 2025-08-08 RX ADMIN — MIDAZOLAM HYDROCHLORIDE 2 MG: 1 INJECTION, SOLUTION INTRAMUSCULAR; INTRAVENOUS at 09:08

## 2025-08-08 RX ADMIN — FENTANYL CITRATE 25 MCG: 50 INJECTION INTRAMUSCULAR; INTRAVENOUS at 10:08

## 2025-08-08 RX ADMIN — ONDANSETRON 8 MG: 2 INJECTION, SOLUTION INTRAMUSCULAR; INTRAVENOUS at 09:08

## 2025-08-08 NOTE — ANESTHESIA PREPROCEDURE EVALUATION
08/08/2025  Alondra Putnam is a 54 y.o., female.      Pre-op Assessment    I have reviewed the Patient Summary Reports.    I have reviewed the NPO Status.   I have reviewed the Medications.     Review of Systems  Anesthesia Hx:  No problems with previous Anesthesia                EENT/Dental:  chronic allergic rhinitis           Cardiovascular:  Cardiovascular Normal                                              Pulmonary:        Sleep Apnea     Obstructive Sleep Apnea (MOJGAN).           Hepatic/GI:     GERD         Gerd          Neurological:  Neurology Normal                                      Endocrine:        Morbid Obesity / BMI > 40      Physical Exam  General: Well nourished    Airway:  Mallampati: II   Mouth Opening: Normal  TM Distance: Normal  Neck ROM: Normal ROM        Anesthesia Plan  Type of Anesthesia, risks & benefits discussed:    Anesthesia Type: Gen Supraglottic Airway  Intra-op Monitoring Plan: Standard ASA Monitors  Induction:  IV  Informed Consent: Informed consent signed with the Patient and all parties understand the risks and agree with anesthesia plan.  All questions answered.   ASA Score: 3    Ready For Surgery From Anesthesia Perspective.     .

## 2025-08-08 NOTE — ANESTHESIA POSTPROCEDURE EVALUATION
Anesthesia Post Evaluation    Patient: Alondra Putnam    Procedure(s) Performed: Procedure(s) (LRB):  ARTHROSCOPY, KNEE, W/ MEDIAL OR LATERAL MENISCECTOMY (Left)    Final Anesthesia Type: general      Patient location during evaluation: PACU  Patient participation: Yes- Able to Participate  Level of consciousness: awake  Post-procedure vital signs: reviewed and stable  Pain management: adequate  Airway patency: patent    PONV status at discharge: No PONV  Anesthetic complications: no      Cardiovascular status: blood pressure returned to baseline  Respiratory status: unassisted  Hydration status: euvolemic  Follow-up not needed.              Vitals Value Taken Time   /71 08/08/25 10:14   Temp  08/08/25 12:58   Pulse 66 08/08/25 10:14   Resp 16 08/08/25 10:14   SpO2 100 % 08/08/25 10:14         Event Time   Out of Recovery 09:44:13         Pain/Jacques Score: Pain Rating Prior to Med Admin: 4 (8/8/2025 10:26 AM)  Pain Rating Post Med Admin: 4 (8/8/2025 10:26 AM)  Jacques Score: 10 (8/8/2025 10:26 AM)

## 2025-08-11 VITALS
HEIGHT: 67 IN | HEART RATE: 66 BPM | BODY MASS INDEX: 41.44 KG/M2 | OXYGEN SATURATION: 100 % | SYSTOLIC BLOOD PRESSURE: 135 MMHG | DIASTOLIC BLOOD PRESSURE: 71 MMHG | RESPIRATION RATE: 16 BRPM | TEMPERATURE: 98 F | WEIGHT: 264 LBS

## 2025-08-13 ENCOUNTER — PATIENT MESSAGE (OUTPATIENT)
Dept: NEUROSURGERY | Facility: CLINIC | Age: 55
End: 2025-08-13
Payer: COMMERCIAL

## 2025-08-15 ENCOUNTER — TELEPHONE (OUTPATIENT)
Dept: ORTHOPEDICS | Facility: CLINIC | Age: 55
End: 2025-08-15
Payer: COMMERCIAL

## 2025-08-18 ENCOUNTER — OFFICE VISIT (OUTPATIENT)
Dept: ORTHOPEDICS | Facility: CLINIC | Age: 55
End: 2025-08-18
Payer: COMMERCIAL

## 2025-08-18 ENCOUNTER — HOSPITAL ENCOUNTER (OUTPATIENT)
Dept: RADIOLOGY | Facility: HOSPITAL | Age: 55
Discharge: HOME | End: 2025-08-18
Attending: ORTHOPAEDIC SURGERY
Payer: COMMERCIAL

## 2025-08-18 DIAGNOSIS — M18.0 ARTHRITIS OF CARPOMETACARPAL (CMC) JOINT OF BOTH THUMBS: Primary | ICD-10-CM

## 2025-08-18 DIAGNOSIS — M18.0 ARTHRITIS OF CARPOMETACARPAL (CMC) JOINT OF BOTH THUMBS: ICD-10-CM

## 2025-08-18 PROCEDURE — 20600 DRAIN/INJ JOINT/BURSA W/O US: CPT | Mod: LT,S$GLB,, | Performed by: ORTHOPAEDIC SURGERY

## 2025-08-18 PROCEDURE — 99999 PR PBB SHADOW E&M-EST. PATIENT-LVL III: CPT | Mod: PBBFAC,,, | Performed by: ORTHOPAEDIC SURGERY

## 2025-08-18 PROCEDURE — 73130 X-RAY EXAM OF HAND: CPT | Mod: 26,50,, | Performed by: RADIOLOGY

## 2025-08-18 PROCEDURE — 99204 OFFICE O/P NEW MOD 45 MIN: CPT | Mod: 25,S$GLB,, | Performed by: ORTHOPAEDIC SURGERY

## 2025-08-18 PROCEDURE — 73130 X-RAY EXAM OF HAND: CPT | Mod: TC,50,PO

## 2025-08-18 RX ORDER — TRIAMCINOLONE ACETONIDE 40 MG/ML
40 INJECTION, SUSPENSION INTRA-ARTICULAR; INTRAMUSCULAR
Status: DISCONTINUED | OUTPATIENT
Start: 2025-08-18 | End: 2025-08-18 | Stop reason: HOSPADM

## 2025-08-18 RX ADMIN — TRIAMCINOLONE ACETONIDE 40 MG: 40 INJECTION, SUSPENSION INTRA-ARTICULAR; INTRAMUSCULAR at 08:08

## 2025-08-20 ENCOUNTER — OFFICE VISIT (OUTPATIENT)
Dept: ORTHOPEDICS | Facility: CLINIC | Age: 55
End: 2025-08-20
Payer: COMMERCIAL

## 2025-08-20 VITALS — HEIGHT: 67 IN | BODY MASS INDEX: 41.42 KG/M2 | WEIGHT: 263.88 LBS | RESPIRATION RATE: 18 BRPM

## 2025-08-20 DIAGNOSIS — S83.232D COMPLEX TEAR OF MEDIAL MENISCUS OF LEFT KNEE AS CURRENT INJURY, SUBSEQUENT ENCOUNTER: Primary | ICD-10-CM

## 2025-08-20 PROCEDURE — 99999 PR PBB SHADOW E&M-EST. PATIENT-LVL IV: CPT | Mod: PBBFAC,,, | Performed by: ORTHOPAEDIC SURGERY

## 2025-09-05 ENCOUNTER — TELEPHONE (OUTPATIENT)
Dept: PAIN MEDICINE | Facility: CLINIC | Age: 55
End: 2025-09-05
Payer: COMMERCIAL

## (undated) DEVICE — TOURNIQUET SB QC DP 34X4IN

## (undated) DEVICE — BLADE SURG CARBON STEEL SZ11

## (undated) DEVICE — BLADE SHAVER LANZA 4.2X13CM

## (undated) DEVICE — DRAPE STERI U-SHAPED 47X51IN

## (undated) DEVICE — UNDERGLOVES BIOGEL PI SIZE 8

## (undated) DEVICE — DRESSING N ADH OIL EMUL 3X3

## (undated) DEVICE — BANDAGE ESMARK 6X12

## (undated) DEVICE — PAD ABDOMINAL STERILE 8X10IN

## (undated) DEVICE — TUBING SUC UNIV W/CONN 12FT

## (undated) DEVICE — NDL HYPO STD REG BVL 22GX1.5IN

## (undated) DEVICE — NDL SPINAL 18GX3.5 SPINOCAN

## (undated) DEVICE — MAT SUCTION PUDDLEVAC ORANGE

## (undated) DEVICE — GOWN SMARTGOWN LVL4 X-LONG XL

## (undated) DEVICE — APPLICATOR CHLORAPREP ORN 26ML

## (undated) DEVICE — HANDLE SURG LIGHT NONRIGID

## (undated) DEVICE — BNDG COFLEX FOAM LF2 ST 6X5YD

## (undated) DEVICE — DRAPE EXTREMITY W/ABC NON-SLIP

## (undated) DEVICE — SPONGE COTTON TRAY 4X4IN

## (undated) DEVICE — PAD CAST SPECIALIST STRL 6

## (undated) DEVICE — SOCKINETTE IMPERVIOUS 12X48IN

## (undated) DEVICE — SUT ETHILON 3-0 PS2 18 BLK

## (undated) DEVICE — MANIFOLD 4 PORT

## (undated) DEVICE — DRAPE U SPLIT SHEET 54X76IN

## (undated) DEVICE — GLOVE SURG BIOGEL LATEX SZ 7.5

## (undated) DEVICE — Device

## (undated) DEVICE — SOL IRR NACL .9% 3000ML

## (undated) DEVICE — TUBE SET INFLOW/OUTFLOW